# Patient Record
Sex: MALE | Race: OTHER | HISPANIC OR LATINO | URBAN - METROPOLITAN AREA
[De-identification: names, ages, dates, MRNs, and addresses within clinical notes are randomized per-mention and may not be internally consistent; named-entity substitution may affect disease eponyms.]

---

## 2017-01-06 ENCOUNTER — EMERGENCY (EMERGENCY)
Facility: HOSPITAL | Age: 52
LOS: 1 days | Discharge: HOME | End: 2017-01-06
Attending: EMERGENCY MEDICINE | Admitting: EMERGENCY MEDICINE
Payer: COMMERCIAL

## 2017-01-06 VITALS
TEMPERATURE: 98 F | OXYGEN SATURATION: 98 % | HEART RATE: 90 BPM | SYSTOLIC BLOOD PRESSURE: 176 MMHG | DIASTOLIC BLOOD PRESSURE: 105 MMHG | RESPIRATION RATE: 16 BRPM

## 2017-01-06 LAB
APPEARANCE UR: CLEAR — SIGNIFICANT CHANGE UP
BILIRUB UR-MCNC: NEGATIVE — SIGNIFICANT CHANGE UP
BLOOD UR QL VISUAL: NEGATIVE — SIGNIFICANT CHANGE UP
COLOR SPEC: YELLOW — SIGNIFICANT CHANGE UP
GLUCOSE UR-MCNC: NEGATIVE — SIGNIFICANT CHANGE UP
KETONES UR-MCNC: NEGATIVE — SIGNIFICANT CHANGE UP
LEUKOCYTE ESTERASE UR-ACNC: NEGATIVE — SIGNIFICANT CHANGE UP
MUCOUS THREADS # UR AUTO: SIGNIFICANT CHANGE UP
NITRITE UR-MCNC: NEGATIVE — SIGNIFICANT CHANGE UP
PH UR: 5.5 — SIGNIFICANT CHANGE UP (ref 4.6–8)
PROT UR-MCNC: 20 — SIGNIFICANT CHANGE UP
RBC CASTS # UR COMP ASSIST: SIGNIFICANT CHANGE UP (ref 0–?)
SP GR SPEC: 1.03 — SIGNIFICANT CHANGE UP (ref 1–1.03)
SQUAMOUS # UR AUTO: SIGNIFICANT CHANGE UP
UROBILINOGEN FLD QL: NORMAL E.U. — SIGNIFICANT CHANGE UP (ref 0.1–0.2)
WBC UR QL: SIGNIFICANT CHANGE UP (ref 0–?)

## 2017-01-06 PROCEDURE — 99283 EMERGENCY DEPT VISIT LOW MDM: CPT

## 2017-01-06 NOTE — ED PROVIDER NOTE - OBJECTIVE STATEMENT
50 y/o M pt with no significant PMHx presents to the ED for persistent, intermittent left groin pain with radiation to lower back and medial left thigh x few days. States sx feel similar to hernia; no hx of hernia. Denies constipation, dysuria, or any other complaints.

## 2017-01-06 NOTE — ED PROVIDER NOTE - PHYSICAL EXAMINATION
no cvat   rest nl  back non tender.   : reducible hernia palpated in left groin, inguinal hernia.     chaperone: [place]

## 2017-01-06 NOTE — ED PROVIDER NOTE - MEDICAL DECISION MAKING DETAILS
Consoled patient. Will check UA. If negative patient instructed to follow up with outpatient surgery for hernia.   advised to avoid constipation

## 2017-01-06 NOTE — ED PROVIDER NOTE - CARE PLAN
Principal Discharge DX:	Unilateral inguinal hernia without obstruction or gangrene, recurrence not specified

## 2017-01-06 NOTE — ED PROVIDER NOTE - NS ED MD SCRIBE ATTENDING SCRIBE SECTIONS
HIV/DISPOSITION/REVIEW OF SYSTEMS/VITAL SIGNS( Pullset)/PAST MEDICAL/SURGICAL/SOCIAL HISTORY/PHYSICAL EXAM/HISTORY OF PRESENT ILLNESS

## 2017-01-18 ENCOUNTER — OUTPATIENT (OUTPATIENT)
Dept: OUTPATIENT SERVICES | Facility: HOSPITAL | Age: 52
LOS: 1 days | End: 2017-01-18
Payer: COMMERCIAL

## 2017-01-18 ENCOUNTER — APPOINTMENT (OUTPATIENT)
Dept: ULTRASOUND IMAGING | Facility: IMAGING CENTER | Age: 52
End: 2017-01-18

## 2017-01-18 DIAGNOSIS — Z00.8 ENCOUNTER FOR OTHER GENERAL EXAMINATION: ICD-10-CM

## 2017-01-18 PROCEDURE — 76882 US LMTD JT/FCL EVL NVASC XTR: CPT

## 2017-06-14 ENCOUNTER — OUTPATIENT (OUTPATIENT)
Dept: OUTPATIENT SERVICES | Facility: HOSPITAL | Age: 52
LOS: 1 days | End: 2017-06-14
Payer: COMMERCIAL

## 2017-06-14 ENCOUNTER — APPOINTMENT (OUTPATIENT)
Dept: CT IMAGING | Facility: IMAGING CENTER | Age: 52
End: 2017-06-14

## 2017-06-14 DIAGNOSIS — Z00.8 ENCOUNTER FOR OTHER GENERAL EXAMINATION: ICD-10-CM

## 2017-06-14 PROCEDURE — 74177 CT ABD & PELVIS W/CONTRAST: CPT

## 2020-04-09 ENCOUNTER — TRANSCRIPTION ENCOUNTER (OUTPATIENT)
Age: 55
End: 2020-04-09

## 2020-08-06 ENCOUNTER — NON-APPOINTMENT (OUTPATIENT)
Age: 55
End: 2020-08-06

## 2020-08-06 ENCOUNTER — APPOINTMENT (OUTPATIENT)
Dept: INTERNAL MEDICINE | Facility: CLINIC | Age: 55
End: 2020-08-06
Payer: COMMERCIAL

## 2020-08-06 ENCOUNTER — LABORATORY RESULT (OUTPATIENT)
Age: 55
End: 2020-08-06

## 2020-08-06 VITALS
TEMPERATURE: 96.1 F | DIASTOLIC BLOOD PRESSURE: 87 MMHG | HEART RATE: 82 BPM | WEIGHT: 234 LBS | SYSTOLIC BLOOD PRESSURE: 125 MMHG | HEIGHT: 70 IN | BODY MASS INDEX: 33.5 KG/M2

## 2020-08-06 DIAGNOSIS — Z83.438 FAMILY HISTORY OF OTHER DISORDER OF LIPOPROTEIN METABOLISM AND OTHER LIPIDEMIA: ICD-10-CM

## 2020-08-06 DIAGNOSIS — Z78.9 OTHER SPECIFIED HEALTH STATUS: ICD-10-CM

## 2020-08-06 DIAGNOSIS — Z82.49 FAMILY HISTORY OF ISCHEMIC HEART DISEASE AND OTHER DISEASES OF THE CIRCULATORY SYSTEM: ICD-10-CM

## 2020-08-06 DIAGNOSIS — L29.9 PRURITUS, UNSPECIFIED: ICD-10-CM

## 2020-08-06 PROCEDURE — 93000 ELECTROCARDIOGRAM COMPLETE: CPT

## 2020-08-06 PROCEDURE — 36415 COLL VENOUS BLD VENIPUNCTURE: CPT

## 2020-08-06 PROCEDURE — 99386 PREV VISIT NEW AGE 40-64: CPT | Mod: 25

## 2020-08-06 NOTE — HEALTH RISK ASSESSMENT
[Patient reported colonoscopy was normal] : Patient reported colonoscopy was normal [With Family] : lives with family [Employed] : employed [] :  [# Of Children ___] : has [unfilled] children [Sexually Active] : sexually active [Fully functional (bathing, dressing, toileting, transferring, walking, feeding)] : Fully functional (bathing, dressing, toileting, transferring, walking, feeding) [Feels Safe at Home] : Feels safe at home [Fully functional (using the telephone, shopping, preparing meals, housekeeping, doing laundry, using] : Fully functional and needs no help or supervision to perform IADLs (using the telephone, shopping, preparing meals, housekeeping, doing laundry, using transportation, managing medications and managing finances) [Smoke Detector] : smoke detector [Seat Belt] :  uses seat belt [Carbon Monoxide Detector] : carbon monoxide detector [Sunscreen] : uses sunscreen [de-identified] : denies now, but will start  [de-identified] : less fruits/vegs.  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

## 2020-08-06 NOTE — ASSESSMENT
[FreeTextEntry1] : 55 year old male with h/o HTN presents for initial annual exam.  overall is doing well.  \par \par main issue is scalp itching at times.  states it would come on suddenly every few days.  noticed pimple like lesions over his scalp that causing itching, usually resolves on its own.  no lesion now.  no h/o eczema or psoriasis\par -dermatology referral \par \par HTN started on losartan several months ago.  controlled \par -cont meds as directed \par -cont checking BP at home \par \par Annual \par -Advised to get yearly Flu shot \par -Advised Yearly Eye exam with Ophthalmologist\par -Advised Yearly Dental exam\par -Educated of the importance of Healthy diet, such as Mediterranean Diet and Exercise, such as walking >20 minutes a day and increasing gradually as tolerated\par \par Preventative screening \par -advised to get colonoscopy for colon cancer screening -referral given \par -will check PSA for prostate cancer screening -labs drawn \par \par f/u in 6 months \par \par \par \par \par

## 2020-08-06 NOTE — HISTORY OF PRESENT ILLNESS
[de-identified] : 55 year old male with h/o HTN presents for initial annual exam.  overall is doing well.  \par main issue is scalp itching at times.  states it would come on suddenly every few days.  noticed pimple like lesions over his scalp that causing itching, usually resolves on its own.  no lesion now.  no h/o eczema or psoriasis\par \par h/o htn, started on losartan several months ago \par \par +overweight, does eat a lot of junk food, protein and carbs which occasionally causes constipation \par \par \par \par \par \par \par \par \par \par

## 2020-08-06 NOTE — PHYSICAL EXAM
[No Varicosities] : no varicosities [Normal] : no respiratory distress, lungs were clear to auscultation bilaterally and no accessory muscle use [Pedal Pulses Present] : the pedal pulses are present [No Edema] : there was no peripheral edema [Non Tender] : non-tender [Soft] : abdomen soft [No Joint Swelling] : no joint swelling [No CVA Tenderness] : no CVA  tenderness [Non-distended] : non-distended [No Spinal Tenderness] : no spinal tenderness [No Skin Lesions] : no skin lesions [Grossly Normal Strength/Tone] : grossly normal strength/tone [No Rash] : no rash [No Focal Deficits] : no focal deficits [Normal Gait] : normal gait [Coordination Grossly Intact] : coordination grossly intact

## 2020-08-07 LAB
25(OH)D3 SERPL-MCNC: 21.1 NG/ML
ALBUMIN SERPL ELPH-MCNC: 4.8 G/DL
ALP BLD-CCNC: 90 U/L
ALT SERPL-CCNC: 26 U/L
ANION GAP SERPL CALC-SCNC: 17 MMOL/L
APPEARANCE: CLEAR
AST SERPL-CCNC: 16 U/L
BACTERIA: NEGATIVE
BASOPHILS # BLD AUTO: 0.02 K/UL
BASOPHILS NFR BLD AUTO: 0.4 %
BILIRUB DIRECT SERPL-MCNC: 0.1 MG/DL
BILIRUB INDIRECT SERPL-MCNC: 0.3 MG/DL
BILIRUB SERPL-MCNC: 0.4 MG/DL
BILIRUBIN URINE: NEGATIVE
BLOOD URINE: NEGATIVE
BUN SERPL-MCNC: 16 MG/DL
CALCIUM SERPL-MCNC: 9.6 MG/DL
CHLORIDE SERPL-SCNC: 102 MMOL/L
CHOLEST SERPL-MCNC: 196 MG/DL
CHOLEST/HDLC SERPL: 5.7 RATIO
CO2 SERPL-SCNC: 20 MMOL/L
COLOR: YELLOW
CREAT SERPL-MCNC: 1.14 MG/DL
EOSINOPHIL # BLD AUTO: 0.04 K/UL
EOSINOPHIL NFR BLD AUTO: 0.7 %
ESTIMATED AVERAGE GLUCOSE: 111 MG/DL
FERRITIN SERPL-MCNC: 431 NG/ML
FOLATE SERPL-MCNC: 10.1 NG/ML
GLUCOSE QUALITATIVE U: NEGATIVE
GLUCOSE SERPL-MCNC: 95 MG/DL
HBA1C MFR BLD HPLC: 5.5 %
HCT VFR BLD CALC: 49.3 %
HDLC SERPL-MCNC: 34 MG/DL
HGB BLD-MCNC: 16.7 G/DL
HYALINE CASTS: 1 /LPF
IMM GRANULOCYTES NFR BLD AUTO: 0.4 %
IRON SATN MFR SERPL: 30 %
IRON SERPL-MCNC: 83 UG/DL
KETONES URINE: NEGATIVE
LDLC SERPL CALC-MCNC: 106 MG/DL
LEUKOCYTE ESTERASE URINE: NEGATIVE
LYMPHOCYTES # BLD AUTO: 2.21 K/UL
LYMPHOCYTES NFR BLD AUTO: 39.5 %
MAN DIFF?: NORMAL
MCHC RBC-ENTMCNC: 30.8 PG
MCHC RBC-ENTMCNC: 33.9 GM/DL
MCV RBC AUTO: 91 FL
MICROSCOPIC-UA: NORMAL
MONOCYTES # BLD AUTO: 0.36 K/UL
MONOCYTES NFR BLD AUTO: 6.4 %
NEUTROPHILS # BLD AUTO: 2.95 K/UL
NEUTROPHILS NFR BLD AUTO: 52.6 %
NITRITE URINE: NEGATIVE
PH URINE: 5.5
PLATELET # BLD AUTO: 237 K/UL
POTASSIUM SERPL-SCNC: 4.4 MMOL/L
PROT SERPL-MCNC: 7.1 G/DL
PROTEIN URINE: NORMAL
RBC # BLD: 5.42 M/UL
RBC # FLD: 12.5 %
RED BLOOD CELLS URINE: 1 /HPF
SAVE SPECIMEN: NORMAL
SODIUM SERPL-SCNC: 139 MMOL/L
SPECIFIC GRAVITY URINE: 1.03
SQUAMOUS EPITHELIAL CELLS: 0 /HPF
TIBC SERPL-MCNC: 278 UG/DL
TRIGL SERPL-MCNC: 281 MG/DL
TSH SERPL-ACNC: 3.03 UIU/ML
UIBC SERPL-MCNC: 195 UG/DL
UROBILINOGEN URINE: NORMAL
VIT B12 SERPL-MCNC: 500 PG/ML
WBC # FLD AUTO: 5.6 K/UL
WHITE BLOOD CELLS URINE: 0 /HPF

## 2021-05-14 ENCOUNTER — LABORATORY RESULT (OUTPATIENT)
Age: 56
End: 2021-05-14

## 2021-05-14 ENCOUNTER — APPOINTMENT (OUTPATIENT)
Dept: INTERNAL MEDICINE | Facility: CLINIC | Age: 56
End: 2021-05-14
Payer: COMMERCIAL

## 2021-05-14 VITALS
WEIGHT: 242 LBS | BODY MASS INDEX: 34.65 KG/M2 | HEART RATE: 89 BPM | HEIGHT: 70 IN | DIASTOLIC BLOOD PRESSURE: 92 MMHG | SYSTOLIC BLOOD PRESSURE: 143 MMHG | TEMPERATURE: 97.2 F | OXYGEN SATURATION: 99 %

## 2021-05-14 DIAGNOSIS — K57.92 DIVERTICULITIS OF INTESTINE, PART UNSPECIFIED, W/OUT PERFORATION OR ABSCESS W/OUT BLEEDING: ICD-10-CM

## 2021-05-14 DIAGNOSIS — R63.5 ABNORMAL WEIGHT GAIN: ICD-10-CM

## 2021-05-14 DIAGNOSIS — K42.9 UMBILICAL HERNIA W/OUT OBSTRUCTION OR GANGRENE: ICD-10-CM

## 2021-05-14 PROCEDURE — 99214 OFFICE O/P EST MOD 30 MIN: CPT | Mod: 25

## 2021-05-14 PROCEDURE — 99072 ADDL SUPL MATRL&STAF TM PHE: CPT

## 2021-05-14 NOTE — ASSESSMENT
[FreeTextEntry1] : 56 year old male history of obesity, hypertension presents complaining of acute left lower quadrant abdominal pain.  Started several days ago following communion of his grandson child.  Associated with sharp tenderness to palpation which has resolved somewhat but still present.  Denies any fever, chills, nausea, vomiting, diarrhea or melena.  last colonoscopy ~10 years ago, still hasn’t followed up.  \par Noted to have 10 pound weight gain since last summer, endorses eating more junk food and snacks.  Also more sedentary at work as picking up over time prior to anticipating intermediate.\par Still has not gotten Covid vaccine\par Symptoms can be related to acute mild diverticulitis\par -Advised we will treat with a course of Augmentin, risks and benefits of medication discussed in detail.\par -Advised to follow-up with gastroenterologist for repeat colonoscopy\par -We will check labs today\par -Advised healthy diet, such as Mediterranean Diet and Exercise, such as walking >20 minutes a day and increasing gradually as tolerated\par -advised to get covid vaccine \par \par HTN, not at goal, Takes losartan majority of time, but occasionally misses doses.  Has not taken it for the past 2 days\par -cont meds as directed \par -educated that increased blood pressure is linked to but limited to increase risk of heart disease, stroke, kidney failure and even death.  stressed the importance to lower values by complying to a less than 2g sodium diet, moderate cardiovascular exercise and decrease stress level.\par \par \par

## 2021-05-14 NOTE — HISTORY OF PRESENT ILLNESS
[FreeTextEntry8] : 56 year old male history of obesity, hypertension presents complaining of acute left lower quadrant abdominal pain.  Started several days ago following communion of his grandson child.  Associated with sharp tenderness to palpation which has resolved somewhat but still present.  Denies any fever, chills, nausea, vomiting, diarrhea or melena.  last colonoscopy ~10 years ago, still hasn’t followed up.  \par Noted to have 10 pound weight gain since last summer, endorses eating more junk food and snacks.  Also more sedentary at work as picking up over time prior to anticipating residential.\par Still has not gotten Covid vaccine\par \par Takes losartan majority of time, but occasionally misses doses.  Has not taken it for the past 2 days\par

## 2021-05-17 LAB
ALBUMIN SERPL ELPH-MCNC: 4.5 G/DL
ALP BLD-CCNC: 109 U/L
ALT SERPL-CCNC: 30 U/L
AMYLASE/CREAT SERPL: 42 U/L
ANION GAP SERPL CALC-SCNC: 14 MMOL/L
AST SERPL-CCNC: 16 U/L
BASOPHILS # BLD AUTO: 0.03 K/UL
BASOPHILS NFR BLD AUTO: 0.4 %
BILIRUB DIRECT SERPL-MCNC: 0.1 MG/DL
BILIRUB INDIRECT SERPL-MCNC: 0.3 MG/DL
BILIRUB SERPL-MCNC: 0.4 MG/DL
BUN SERPL-MCNC: 19 MG/DL
CALCIUM SERPL-MCNC: 9.4 MG/DL
CHLORIDE SERPL-SCNC: 101 MMOL/L
CHOLEST SERPL-MCNC: 194 MG/DL
CO2 SERPL-SCNC: 23 MMOL/L
CREAT SERPL-MCNC: 1.11 MG/DL
EOSINOPHIL # BLD AUTO: 0.08 K/UL
EOSINOPHIL NFR BLD AUTO: 1.1 %
ESTIMATED AVERAGE GLUCOSE: 120 MG/DL
FOLATE SERPL-MCNC: 7.4 NG/ML
GLUCOSE SERPL-MCNC: 113 MG/DL
HBA1C MFR BLD HPLC: 5.8 %
HCT VFR BLD CALC: 48.9 %
HDLC SERPL-MCNC: 33 MG/DL
HGB BLD-MCNC: 16.7 G/DL
IMM GRANULOCYTES NFR BLD AUTO: 0.3 %
LDLC SERPL CALC-MCNC: 121 MG/DL
LPL SERPL-CCNC: 22 U/L
LYMPHOCYTES # BLD AUTO: 2.23 K/UL
LYMPHOCYTES NFR BLD AUTO: 32 %
MAN DIFF?: NORMAL
MCHC RBC-ENTMCNC: 30.3 PG
MCHC RBC-ENTMCNC: 34.2 GM/DL
MCV RBC AUTO: 88.7 FL
MONOCYTES # BLD AUTO: 0.53 K/UL
MONOCYTES NFR BLD AUTO: 7.6 %
NEUTROPHILS # BLD AUTO: 4.08 K/UL
NEUTROPHILS NFR BLD AUTO: 58.6 %
NONHDLC SERPL-MCNC: 161 MG/DL
PLATELET # BLD AUTO: 275 K/UL
POTASSIUM SERPL-SCNC: 4.3 MMOL/L
PROT SERPL-MCNC: 7.1 G/DL
RBC # BLD: 5.51 M/UL
RBC # FLD: 12.5 %
SODIUM SERPL-SCNC: 138 MMOL/L
TRIGL SERPL-MCNC: 197 MG/DL
TSH SERPL-ACNC: 4.24 UIU/ML
VIT B12 SERPL-MCNC: 473 PG/ML
WBC # FLD AUTO: 6.97 K/UL

## 2021-05-25 ENCOUNTER — RX RENEWAL (OUTPATIENT)
Age: 56
End: 2021-05-25

## 2021-11-15 ENCOUNTER — APPOINTMENT (OUTPATIENT)
Dept: INTERNAL MEDICINE | Facility: CLINIC | Age: 56
End: 2021-11-15
Payer: COMMERCIAL

## 2021-11-15 ENCOUNTER — NON-APPOINTMENT (OUTPATIENT)
Age: 56
End: 2021-11-15

## 2021-11-15 VITALS
HEIGHT: 70 IN | OXYGEN SATURATION: 95 % | SYSTOLIC BLOOD PRESSURE: 137 MMHG | HEART RATE: 85 BPM | WEIGHT: 235 LBS | BODY MASS INDEX: 33.64 KG/M2 | DIASTOLIC BLOOD PRESSURE: 91 MMHG

## 2021-11-15 DIAGNOSIS — E55.9 VITAMIN D DEFICIENCY, UNSPECIFIED: ICD-10-CM

## 2021-11-15 DIAGNOSIS — M16.0 BILATERAL PRIMARY OSTEOARTHRITIS OF HIP: ICD-10-CM

## 2021-11-15 PROCEDURE — 93000 ELECTROCARDIOGRAM COMPLETE: CPT

## 2021-11-15 PROCEDURE — 99396 PREV VISIT EST AGE 40-64: CPT | Mod: 25

## 2021-11-15 RX ORDER — AMOXICILLIN AND CLAVULANATE POTASSIUM 875; 125 MG/1; MG/1
875-125 TABLET, COATED ORAL
Qty: 14 | Refills: 0 | Status: DISCONTINUED | COMMUNITY
Start: 2021-05-14 | End: 2021-11-15

## 2021-11-15 NOTE — HEALTH RISK ASSESSMENT
[Good] : ~his/her~  mood as  good [No] : No [No falls in past year] : Patient reported no falls in the past year [0] : 2) Feeling down, depressed, or hopeless: Not at all (0) [Employed] : employed [Fully functional (bathing, dressing, toileting, transferring, walking, feeding)] : Fully functional (bathing, dressing, toileting, transferring, walking, feeding) [Fully functional (using the telephone, shopping, preparing meals, housekeeping, doing laundry, using] : Fully functional and needs no help or supervision to perform IADLs (using the telephone, shopping, preparing meals, housekeeping, doing laundry, using transportation, managing medications and managing finances) [] : No [[id number]] : 0 [Patient reported colonoscopy was normal] : Patient reported colonoscopy was normal [Change in mental status noted] : No change in mental status noted [Reports changes in hearing] : Reports no changes in hearing [Reports changes in vision] : Reports no changes in vision [Reports changes in dental health] : Reports no changes in dental health [ColonoscopyDate] : 2011

## 2021-11-15 NOTE — HISTORY OF PRESENT ILLNESS
[de-identified] : 56 year old male with h/o HTN presents for annual exam.  \par overall is doing well.  \par \par h/o htn, started on losartan. occasionally misses doses.   Takes about 4 to 5 days a week\par \par +overweight, does eat a lot of junk food, protein and carbs which occasionally causes constipation \par \par h/o nicotine dependence, stopped smoking 1 pack/day about 5 years ago.  Occasionally relapses smoking for 2-3 days in a row then stops.  Denies any coughing, shortness of breath or chest pain.\par \par \par \par \par \par \par \par \par \par

## 2021-11-15 NOTE — ASSESSMENT
[FreeTextEntry1] : 56 year old male history of obesity, hypertension presents for annual \par \par \par HTN, stable \par -urged compliance with meds\par -educated that increased blood pressure is linked to but limited to increase risk of heart disease, stroke, kidney failure and even death.  stressed the importance to lower values by complying to a less than 2g sodium diet, moderate cardiovascular exercise and decrease stress level.\par \par Overweight\par -Being overweight increases your risk of health conditions such as heart disease, high blood pressure, type 2 diabetes, and certain types of cancer. It can also increase your risk for osteoarthritis, sleep apnea, and other respiratory problems. Aim for a slow, steady weight loss. Even a small amount of weight loss can lower your risk of health problems.\par \par h/o nicotine dependence smoker \par -start annual  lung CA screening \par \par Annual \par -Advised to get yearly Flu shot -refused \par -Advised Yearly Eye exam with Ophthalmologist\par -Advised Yearly Dental exam\par -Educated of the importance of Healthy diet, such as Mediterranean Diet and Exercise, such as walking >20 minutes a day and increasing gradually as tolerated\par \par Preventative screening \par -advised to get colonoscopy for colon cancer screening -referral given \par -will check PSA for prostate cancer screening -labs drawn \par \par -covid -up to date \par \par -discussed shingles vaccine (shingrix), advised to verify with insurance if its covered through medical or prescription plan\par \par \par \par \par \par \par \par \par \par \par

## 2021-11-15 NOTE — PHYSICAL EXAM
[Normal] : normal rate, regular rhythm, normal S1 and S2 and no murmur heard [No Varicosities] : no varicosities [Pedal Pulses Present] : the pedal pulses are present [No Edema] : there was no peripheral edema [No Extremity Clubbing/Cyanosis] : no extremity clubbing/cyanosis [Soft] : abdomen soft [Non-distended] : non-distended [Normal Bowel Sounds] : normal bowel sounds [No CVA Tenderness] : no CVA  tenderness [No Joint Swelling] : no joint swelling [Grossly Normal Strength/Tone] : grossly normal strength/tone [No Rash] : no rash [No Focal Deficits] : no focal deficits [Normal Affect] : the affect was normal [Normal Mood] : the mood was normal

## 2021-11-16 PROBLEM — E55.9 VITAMIN D DEFICIENCY, UNSPECIFIED: Status: ACTIVE | Noted: 2021-11-16

## 2021-11-16 LAB
25(OH)D3 SERPL-MCNC: 14.2 NG/ML
ALBUMIN SERPL ELPH-MCNC: 4.4 G/DL
ALP BLD-CCNC: 96 U/L
ALT SERPL-CCNC: 18 U/L
ANION GAP SERPL CALC-SCNC: 14 MMOL/L
APPEARANCE: CLEAR
AST SERPL-CCNC: 11 U/L
BACTERIA: NEGATIVE
BASOPHILS # BLD AUTO: 0.03 K/UL
BASOPHILS NFR BLD AUTO: 0.5 %
BILIRUB DIRECT SERPL-MCNC: 0.1 MG/DL
BILIRUB INDIRECT SERPL-MCNC: 0.4 MG/DL
BILIRUB SERPL-MCNC: 0.5 MG/DL
BILIRUBIN URINE: NEGATIVE
BLOOD URINE: NEGATIVE
BUN SERPL-MCNC: 20 MG/DL
CALCIUM SERPL-MCNC: 9.3 MG/DL
CHLORIDE SERPL-SCNC: 104 MMOL/L
CHOLEST SERPL-MCNC: 196 MG/DL
CO2 SERPL-SCNC: 23 MMOL/L
COLOR: YELLOW
CREAT SERPL-MCNC: 1.28 MG/DL
EOSINOPHIL # BLD AUTO: 0.1 K/UL
EOSINOPHIL NFR BLD AUTO: 1.6 %
ESTIMATED AVERAGE GLUCOSE: 114 MG/DL
FERRITIN SERPL-MCNC: 393 NG/ML
FOLATE SERPL-MCNC: 7.9 NG/ML
GLUCOSE QUALITATIVE U: NEGATIVE
GLUCOSE SERPL-MCNC: 107 MG/DL
HBA1C MFR BLD HPLC: 5.6 %
HCT VFR BLD CALC: 50 %
HDLC SERPL-MCNC: 30 MG/DL
HGB BLD-MCNC: 16.9 G/DL
HYALINE CASTS: 0 /LPF
IMM GRANULOCYTES NFR BLD AUTO: 0.3 %
IRON SATN MFR SERPL: 29 %
IRON SERPL-MCNC: 80 UG/DL
KETONES URINE: NEGATIVE
LDLC SERPL CALC-MCNC: 117 MG/DL
LEUKOCYTE ESTERASE URINE: NEGATIVE
LYMPHOCYTES # BLD AUTO: 2.68 K/UL
LYMPHOCYTES NFR BLD AUTO: 41.6 %
MAN DIFF?: NORMAL
MCHC RBC-ENTMCNC: 30.6 PG
MCHC RBC-ENTMCNC: 33.8 GM/DL
MCV RBC AUTO: 90.4 FL
MICROSCOPIC-UA: NORMAL
MONOCYTES # BLD AUTO: 0.54 K/UL
MONOCYTES NFR BLD AUTO: 8.4 %
NEUTROPHILS # BLD AUTO: 3.07 K/UL
NEUTROPHILS NFR BLD AUTO: 47.6 %
NITRITE URINE: NEGATIVE
NONHDLC SERPL-MCNC: 167 MG/DL
PH URINE: 6
PLATELET # BLD AUTO: 268 K/UL
POTASSIUM SERPL-SCNC: 4.4 MMOL/L
PROT SERPL-MCNC: 6.6 G/DL
PROTEIN URINE: ABNORMAL
PSA SERPL-MCNC: 0.51 NG/ML
RBC # BLD: 5.53 M/UL
RBC # FLD: 12.8 %
RED BLOOD CELLS URINE: 1 /HPF
SODIUM SERPL-SCNC: 141 MMOL/L
SPECIFIC GRAVITY URINE: >=1.03
SQUAMOUS EPITHELIAL CELLS: 1 /HPF
TIBC SERPL-MCNC: 280 UG/DL
TRIGL SERPL-MCNC: 251 MG/DL
TSH SERPL-ACNC: 3.16 UIU/ML
UIBC SERPL-MCNC: 200 UG/DL
UROBILINOGEN URINE: NORMAL
VIT B12 SERPL-MCNC: 513 PG/ML
WBC # FLD AUTO: 6.44 K/UL
WHITE BLOOD CELLS URINE: 1 /HPF

## 2021-12-08 ENCOUNTER — RX RENEWAL (OUTPATIENT)
Age: 56
End: 2021-12-08

## 2022-01-11 ENCOUNTER — APPOINTMENT (OUTPATIENT)
Dept: GASTROENTEROLOGY | Facility: CLINIC | Age: 57
End: 2022-01-11
Payer: COMMERCIAL

## 2022-01-11 VITALS
HEIGHT: 70 IN | SYSTOLIC BLOOD PRESSURE: 135 MMHG | BODY MASS INDEX: 33.79 KG/M2 | HEART RATE: 95 BPM | DIASTOLIC BLOOD PRESSURE: 90 MMHG | OXYGEN SATURATION: 97 % | TEMPERATURE: 98 F | WEIGHT: 236 LBS

## 2022-01-11 DIAGNOSIS — Z20.822 ENCOUNTER FOR PREPROCEDURAL LABORATORY EXAMINATION: ICD-10-CM

## 2022-01-11 DIAGNOSIS — K64.9 UNSPECIFIED HEMORRHOIDS: ICD-10-CM

## 2022-01-11 DIAGNOSIS — Z01.812 ENCOUNTER FOR PREPROCEDURAL LABORATORY EXAMINATION: ICD-10-CM

## 2022-01-11 PROCEDURE — 99204 OFFICE O/P NEW MOD 45 MIN: CPT

## 2022-01-11 NOTE — HISTORY OF PRESENT ILLNESS
[Heartburn] : denies heartburn [Nausea] : denies nausea [Vomiting] : denies vomiting [Diarrhea] : denies diarrhea [Constipation] : denies constipation [Yellow Skin Or Eyes (Jaundice)] : denies jaundice [Abdominal Pain] : denies abdominal pain [Abdominal Swelling] : denies abdominal swelling [Rectal Pain] : rectal pain [Wt Gain ___ Lbs] : no recent weight gain [Wt Loss ___ Lbs] : no recent weight loss [GERD] : no gastroesophageal reflux disease [Hiatus Hernia] : no hiatus hernia [Peptic Ulcer Disease] : no peptic ulcer disease [Pancreatitis] : no pancreatitis [Cholelithiasis] : no cholelithiasis [Kidney Stone] : no kidney stone [Inflammatory Bowel Disease] : no inflammatory bowel disease [Irritable Bowel Syndrome] : no irritable bowel syndrome [Diverticulitis] : no diverticulitis [Alcohol Abuse] : no alcohol abuse [Malignancy] : no malignancy [Abdominal Surgery] : no abdominal surgery [Appendectomy] : no appendectomy [Cholecystectomy] : no cholecystectomy [de-identified] : 57-year-old man presents for screening colonoscopy.  His last colonoscopy was over 10 years ago.  He complains of some rectal pain and pressure that he attributes to hemorrhoids.  He denies rectal bleeding, melena or hematemesis.  He was treated for acute diverticulitis several months ago and has no abdominal pain at the present time.

## 2022-01-11 NOTE — CONSULT LETTER
[Dear  ___] : Dear  [unfilled], [Consult Letter:] : I had the pleasure of evaluating your patient, [unfilled]. [Please see my note below.] : Please see my note below. [Consult Closing:] : Thank you very much for allowing me to participate in the care of this patient.  If you have any questions, please do not hesitate to contact me. [Sincerely,] : Sincerely, [FreeTextEntry3] : See GI Consult note.\par Thank you,\par Marques Mata MD, FACG\par

## 2022-01-11 NOTE — ASSESSMENT
[FreeTextEntry1] : Screening colonoscopy.  The importance of colon cancer screening and the colonoscopy prep was discussed with the patient.  He understands and all questions were answered.\par Internal hemorrhoids.  Topical treatment was prescribed.

## 2022-01-11 NOTE — PHYSICAL EXAM
[General Appearance - Alert] : alert [General Appearance - In No Acute Distress] : in no acute distress [Sclera] : the sclera and conjunctiva were normal [PERRL With Normal Accommodation] : pupils were equal in size, round, and reactive to light [Extraocular Movements] : extraocular movements were intact [Outer Ear] : the ears and nose were normal in appearance [Neck Appearance] : the appearance of the neck was normal [Neck Cervical Mass (___cm)] : no neck mass was observed [Jugular Venous Distention Increased] : there was no jugular-venous distention [Thyroid Diffuse Enlargement] : the thyroid was not enlarged [Thyroid Nodule] : there were no palpable thyroid nodules [Auscultation Breath Sounds / Voice Sounds] : lungs were clear to auscultation bilaterally [Heart Rate And Rhythm] : heart rate was normal and rhythm regular [Heart Sounds] : normal S1 and S2 [Heart Sounds Gallop] : no gallops [Murmurs] : no murmurs [Heart Sounds Pericardial Friction Rub] : no pericardial rub [Bowel Sounds] : normal bowel sounds [Abdomen Soft] : soft [Abdomen Tenderness] : non-tender [] : no hepato-splenomegaly [Abdomen Mass (___ Cm)] : no abdominal mass palpated [Normal Sphincter Tone] : normal sphincter tone [No Rectal Mass] : no rectal mass [Internal Hemorrhoid] : internal hemorrhoids [Occult Blood Positive] : stool was negative for occult blood [Cervical Lymph Nodes Enlarged Posterior Bilaterally] : posterior cervical [Cervical Lymph Nodes Enlarged Anterior Bilaterally] : anterior cervical [Supraclavicular Lymph Nodes Enlarged Bilaterally] : supraclavicular [No CVA Tenderness] : no ~M costovertebral angle tenderness [No Spinal Tenderness] : no spinal tenderness [Abnormal Walk] : normal gait [Nail Clubbing] : no clubbing  or cyanosis of the fingernails [Musculoskeletal - Swelling] : no joint swelling seen [Motor Tone] : muscle strength and tone were normal [Deep Tendon Reflexes (DTR)] : deep tendon reflexes were 2+ and symmetric [Sensation] : the sensory exam was normal to light touch and pinprick [No Focal Deficits] : no focal deficits [Oriented To Time, Place, And Person] : oriented to person, place, and time [Impaired Insight] : insight and judgment were intact [Affect] : the affect was normal

## 2022-03-16 ENCOUNTER — APPOINTMENT (OUTPATIENT)
Dept: GASTROENTEROLOGY | Facility: AMBULATORY MEDICAL SERVICES | Age: 57
End: 2022-03-16
Payer: COMMERCIAL

## 2022-03-16 PROCEDURE — 45385 COLONOSCOPY W/LESION REMOVAL: CPT | Mod: 33

## 2022-03-16 PROCEDURE — 45381 COLONOSCOPY SUBMUCOUS NJX: CPT | Mod: 33,59

## 2022-03-16 PROCEDURE — 45380 COLONOSCOPY AND BIOPSY: CPT | Mod: 33,59

## 2022-04-12 ENCOUNTER — APPOINTMENT (OUTPATIENT)
Dept: GASTROENTEROLOGY | Facility: CLINIC | Age: 57
End: 2022-04-12
Payer: COMMERCIAL

## 2022-04-12 VITALS
BODY MASS INDEX: 33.79 KG/M2 | OXYGEN SATURATION: 98 % | HEIGHT: 70 IN | WEIGHT: 236 LBS | SYSTOLIC BLOOD PRESSURE: 152 MMHG | HEART RATE: 107 BPM | TEMPERATURE: 97.2 F | DIASTOLIC BLOOD PRESSURE: 94 MMHG

## 2022-04-12 DIAGNOSIS — K63.5 POLYP OF COLON: ICD-10-CM

## 2022-04-12 PROCEDURE — 99213 OFFICE O/P EST LOW 20 MIN: CPT

## 2022-04-12 NOTE — ASSESSMENT
[FreeTextEntry1] : Screening colonoscopy in this high-risk patient with a history of colon polyps.  The importance of colon cancer screening and surveillance colonoscopy was discussed with the patient.  He will return in 1 year for a follow-up colonoscopy.

## 2022-04-12 NOTE — PHYSICAL EXAM
[General Appearance - Alert] : alert [General Appearance - In No Acute Distress] : in no acute distress [Neck Appearance] : the appearance of the neck was normal [Neck Cervical Mass (___cm)] : no neck mass was observed [Jugular Venous Distention Increased] : there was no jugular-venous distention [Thyroid Diffuse Enlargement] : the thyroid was not enlarged [Thyroid Nodule] : there were no palpable thyroid nodules [] : no respiratory distress [Auscultation Breath Sounds / Voice Sounds] : lungs were clear to auscultation bilaterally [Heart Rate And Rhythm] : heart rate was normal and rhythm regular [Heart Sounds] : normal S1 and S2 [Heart Sounds Gallop] : no gallops [Murmurs] : no murmurs [Heart Sounds Pericardial Friction Rub] : no pericardial rub [Cervical Lymph Nodes Enlarged Posterior Bilaterally] : posterior cervical [Cervical Lymph Nodes Enlarged Anterior Bilaterally] : anterior cervical [Supraclavicular Lymph Nodes Enlarged Bilaterally] : supraclavicular [Axillary Lymph Nodes Enlarged Bilaterally] : axillary [No CVA Tenderness] : no ~M costovertebral angle tenderness [No Spinal Tenderness] : no spinal tenderness

## 2022-04-12 NOTE — HISTORY OF PRESENT ILLNESS
[de-identified] : 57-year-old man comes for follow-up after undergoing a screening colonoscopy. He had 3 polyps removed from the transverse descending and distal sigmoid colon.  The polyps of tubular adenoma.  He tolerated the procedure well.

## 2022-09-08 ENCOUNTER — APPOINTMENT (OUTPATIENT)
Dept: INTERNAL MEDICINE | Facility: CLINIC | Age: 57
End: 2022-09-08

## 2022-09-08 ENCOUNTER — LABORATORY RESULT (OUTPATIENT)
Age: 57
End: 2022-09-08

## 2022-09-08 ENCOUNTER — NON-APPOINTMENT (OUTPATIENT)
Age: 57
End: 2022-09-08

## 2022-09-08 VITALS
DIASTOLIC BLOOD PRESSURE: 87 MMHG | WEIGHT: 238 LBS | HEART RATE: 84 BPM | SYSTOLIC BLOOD PRESSURE: 139 MMHG | BODY MASS INDEX: 34.07 KG/M2 | HEIGHT: 70 IN

## 2022-09-08 PROCEDURE — 93000 ELECTROCARDIOGRAM COMPLETE: CPT | Mod: 59

## 2022-09-08 PROCEDURE — 36415 COLL VENOUS BLD VENIPUNCTURE: CPT

## 2022-09-08 PROCEDURE — 99214 OFFICE O/P EST MOD 30 MIN: CPT | Mod: 25

## 2022-09-08 RX ORDER — HYDROCORTISONE ACETATE 25 MG/1
25 SUPPOSITORY RECTAL TWICE DAILY
Qty: 2 | Refills: 1 | Status: DISCONTINUED | COMMUNITY
Start: 2022-01-11 | End: 2022-09-08

## 2022-09-08 NOTE — ASSESSMENT
[FreeTextEntry1] : 56 year old male history of obesity, hypertension presents for follow-up.  \par \par GastroEsophageal Reflux Disease (GERD)\par -nature of disease discussed in length\par -omeprazole as directed \par -To prevent heartburn after meals:  Don't overeat. Eat five or six small meals each day, instead of several large meals. Don't eat before bedtime. Allow 2 hours to digest your food before lying down. This allows time for the food to pass out of the stomach and into the small intestine, rather than having it back up into the esophagus. Lying down makes digestion difficult and makes heartburn more likely.\par -Several foods are known to lead to GERD include: Alcohol, particularly red wine, Black pepper, garlic, raw onions, and other spicy foods, chocolate, citrus fruits and products, such as mirlande, oranges and orange juice,  coffee and caffeinated drinks, including tea and soda, peppermint, tomatoes \par -However, unless these foods are causing you heartburn you don't have to avoid them. \par -Several medications are known lead to GERD include-regular use of aspirin, NSAIDs include ibuprofen, naproxen, Celebrex, Sedatives, Narcotic painkillers, Progesterone, a hormone found in some birth control pills, iron and potassium.\par -advised to followup with GI for possible EGD\par \par HTN, stable \par -urged compliance with meds\par \par Overweight\par -Being overweight increases your risk of health conditions such as heart disease, high blood pressure, type 2 diabetes, and certain types of cancer. It can also increase your risk for osteoarthritis, sleep apnea, and other respiratory problems. Aim for a slow, steady weight loss. Even a small amount of weight loss can lower your risk of health problems.\par \par h/o nicotine dependence smoker \par -start annual  lung CA screening \par \par HCM\par -advised to get colonoscopy for colon cancer screening -referral given \par -will check PSA for prostate cancer screening -labs drawn \par -covid -up to date \par \par -discussed shingles vaccine (shingrix), advised to verify with insurance if its covered through medical or prescription plan\par \par \par \par \par \par \par \par \par \par \par \par \par

## 2022-09-08 NOTE — PHYSICAL EXAM
[Normal] : normal rate, regular rhythm, normal S1 and S2 and no murmur heard [No Varicosities] : no varicosities [Pedal Pulses Present] : the pedal pulses are present [No Edema] : there was no peripheral edema [No Extremity Clubbing/Cyanosis] : no extremity clubbing/cyanosis [Soft] : abdomen soft [Non-distended] : non-distended [Normal Bowel Sounds] : normal bowel sounds [No Rash] : no rash [No Focal Deficits] : no focal deficits

## 2022-09-08 NOTE — HISTORY OF PRESENT ILLNESS
[de-identified] : 57 year old male with h/o HTN presents for follow-up. \par \par states overall is doing ok.  besides increased Heartburn-like symptoms.  worse while lying down.  no radiation or alleviating symptoms.  Does endorse eating late and lying down at night.  States has tried taking once omeprazole which had helped.  No sobe or palpitations.  \par ecg- nsr, no changes \par \par h/o HTN, started on losartan. occasionally misses doses.   Takes about 4 to 5 days a week.  \par \par h/o nicotine dependence, stopped smoking 1 pack/day about 5 years ago.  Occasionally relapses smoking for 2-3 days in a row then stops.  Denies any coughing, shortness of breath or chest pain.

## 2022-09-13 LAB
ALBUMIN SERPL ELPH-MCNC: 4.5 G/DL
ALP BLD-CCNC: 105 U/L
ALT SERPL-CCNC: 25 U/L
ANION GAP SERPL CALC-SCNC: 13 MMOL/L
AST SERPL-CCNC: 13 U/L
BASOPHILS # BLD AUTO: 0.03 K/UL
BASOPHILS NFR BLD AUTO: 0.5 %
BILIRUB DIRECT SERPL-MCNC: 0.1 MG/DL
BILIRUB INDIRECT SERPL-MCNC: 0.3 MG/DL
BILIRUB SERPL-MCNC: 0.4 MG/DL
BUN SERPL-MCNC: 17 MG/DL
CALCIUM SERPL-MCNC: 9.2 MG/DL
CHLORIDE SERPL-SCNC: 103 MMOL/L
CHOLEST SERPL-MCNC: 237 MG/DL
CO2 SERPL-SCNC: 23 MMOL/L
CREAT SERPL-MCNC: 1.2 MG/DL
EGFR: 71 ML/MIN/1.73M2
EOSINOPHIL # BLD AUTO: 0.09 K/UL
EOSINOPHIL NFR BLD AUTO: 1.5 %
ESTIMATED AVERAGE GLUCOSE: 117 MG/DL
GLUCOSE SERPL-MCNC: 96 MG/DL
HBA1C MFR BLD HPLC: 5.7 %
HCT VFR BLD CALC: 46.5 %
HDLC SERPL-MCNC: 27 MG/DL
HGB BLD-MCNC: 16.8 G/DL
IMM GRANULOCYTES NFR BLD AUTO: 0.2 %
LDLC SERPL CALC-MCNC: NORMAL MG/DL
LYMPHOCYTES # BLD AUTO: 2.69 K/UL
LYMPHOCYTES NFR BLD AUTO: 44.6 %
MAN DIFF?: NORMAL
MCHC RBC-ENTMCNC: 31.6 PG
MCHC RBC-ENTMCNC: 36.1 GM/DL
MCV RBC AUTO: 87.6 FL
MONOCYTES # BLD AUTO: 0.49 K/UL
MONOCYTES NFR BLD AUTO: 8.1 %
NEUTROPHILS # BLD AUTO: 2.72 K/UL
NEUTROPHILS NFR BLD AUTO: 45.1 %
NONHDLC SERPL-MCNC: 210 MG/DL
PLATELET # BLD AUTO: 252 K/UL
POTASSIUM SERPL-SCNC: 4.5 MMOL/L
PROT SERPL-MCNC: 6.9 G/DL
RBC # BLD: 5.31 M/UL
RBC # FLD: 12.8 %
SODIUM SERPL-SCNC: 139 MMOL/L
TRIGL SERPL-MCNC: 600 MG/DL
TSH SERPL-ACNC: 4.85 UIU/ML
WBC # FLD AUTO: 6.03 K/UL

## 2022-10-17 ENCOUNTER — APPOINTMENT (OUTPATIENT)
Dept: INTERNAL MEDICINE | Facility: CLINIC | Age: 57
End: 2022-10-17

## 2022-10-17 ENCOUNTER — LABORATORY RESULT (OUTPATIENT)
Age: 57
End: 2022-10-17

## 2022-10-17 VITALS
WEIGHT: 236 LBS | HEART RATE: 84 BPM | DIASTOLIC BLOOD PRESSURE: 93 MMHG | BODY MASS INDEX: 33.79 KG/M2 | SYSTOLIC BLOOD PRESSURE: 146 MMHG | HEIGHT: 70 IN

## 2022-10-17 DIAGNOSIS — Z23 ENCOUNTER FOR IMMUNIZATION: ICD-10-CM

## 2022-10-17 PROCEDURE — 36415 COLL VENOUS BLD VENIPUNCTURE: CPT | Mod: 59

## 2022-10-17 PROCEDURE — 90686 IIV4 VACC NO PRSV 0.5 ML IM: CPT

## 2022-10-17 PROCEDURE — G0008: CPT

## 2022-10-17 PROCEDURE — 99214 OFFICE O/P EST MOD 30 MIN: CPT | Mod: 25

## 2022-10-17 NOTE — ASSESSMENT
[FreeTextEntry1] : 57 year old male history of obesity, hypertension presents for follow-up.  \par \par GastroEsophageal Reflux Disease (GERD)\par -nature of disease discussed in length\par -omeprazole as directed \par -advised to followup with GI for EGD\par \par HTN, stable \par -urged compliance with meds\par \par Hyperlipidemia with triglycerides 600.  States fasting 7 hours prior to blood draw today.\par -Advised if levels still elevated would recommend starting statin as discussed\par -Will check labs \par -Advised decrease greasy, fatty foods, increase exercise, fiber intake \par -Elevated cholesterol has been linked to increase in cardiovascular even such as heart attack, stroke, peripheral artery disease and death\par \par \par Overweight\par -Being overweight increases your risk of health conditions such as heart disease, high blood pressure, type 2 diabetes, and certain types of cancer. It can also increase your risk for osteoarthritis, sleep apnea, and other respiratory problems. Aim for a slow, steady weight loss. Even a small amount of weight loss can lower your risk of health problems.\par \par h/o nicotine dependence smoker \par -start annual  lung CA screening -still needs to complete \par \par HCM\par -advised to get colonoscopy for colon cancer screening -referral given \par -will check PSA for prostate cancer screening -labs drawn \par -covid -up to date \par \par Please note that 36 minutes time spent in care with this patient which includes pre-visit preparation such as reviewed pertinent labs, imaging, and specialists consultation, in-person visit, post-visit documentation and discussion.\par \par \par \par \par \par \par \par \par \par \par \par \par

## 2022-10-17 NOTE — PHYSICAL EXAM
[Normal] : normal rate, regular rhythm, normal S1 and S2 and no murmur heard [No Varicosities] : no varicosities [Pedal Pulses Present] : the pedal pulses are present [No Edema] : there was no peripheral edema [No Extremity Clubbing/Cyanosis] : no extremity clubbing/cyanosis [Soft] : abdomen soft [Non-distended] : non-distended [Normal Bowel Sounds] : normal bowel sounds

## 2022-10-17 NOTE — HISTORY OF PRESENT ILLNESS
[de-identified] : 57 year old male with h/o HTN presents for follow-up. \par \par never started statin, \par \par GERD better, but still present.  no dark or black stools.  former smoker \par \par states overall is doing ok.  besides increased Heartburn-like symptoms.  worse while lying down.  no radiation or alleviating symptoms.  Does endorse eating late and lying down at night.  States has tried taking once omeprazole which had helped.  No sobe or palpitations.  \par ecg- nsr, no changes \par \par h/o HTN, started on losartan. occasionally misses doses.   Takes about 4 to 5 days a week.  \par \par h/o nicotine dependence, stopped smoking 1 pack/day about 5 years ago.  Occasionally relapses smoking for 2-3 days in a row then stops.  Denies any coughing, shortness of breath or chest pain.

## 2022-10-20 LAB
ALBUMIN SERPL ELPH-MCNC: 4.7 G/DL
ALP BLD-CCNC: 108 U/L
ALT SERPL-CCNC: 22 U/L
ANION GAP SERPL CALC-SCNC: 13 MMOL/L
AST SERPL-CCNC: 16 U/L
BASOPHILS # BLD AUTO: 0.03 K/UL
BASOPHILS NFR BLD AUTO: 0.3 %
BILIRUB DIRECT SERPL-MCNC: 0.1 MG/DL
BILIRUB INDIRECT SERPL-MCNC: 0.2 MG/DL
BILIRUB SERPL-MCNC: 0.3 MG/DL
BUN SERPL-MCNC: 16 MG/DL
CALCIUM SERPL-MCNC: 9.7 MG/DL
CHLORIDE SERPL-SCNC: 102 MMOL/L
CHOLEST SERPL-MCNC: 215 MG/DL
CO2 SERPL-SCNC: 24 MMOL/L
CREAT SERPL-MCNC: 1.12 MG/DL
EGFR: 77 ML/MIN/1.73M2
EOSINOPHIL # BLD AUTO: 0.11 K/UL
EOSINOPHIL NFR BLD AUTO: 1.2 %
ESTIMATED AVERAGE GLUCOSE: 114 MG/DL
GLUCOSE SERPL-MCNC: 100 MG/DL
HBA1C MFR BLD HPLC: 5.6 %
HCT VFR BLD CALC: 49.8 %
HDLC SERPL-MCNC: 29 MG/DL
HGB BLD-MCNC: 17.5 G/DL
IMM GRANULOCYTES NFR BLD AUTO: 0.2 %
LDLC SERPL CALC-MCNC: 108 MG/DL
LYMPHOCYTES # BLD AUTO: 3.11 K/UL
LYMPHOCYTES NFR BLD AUTO: 34.7 %
MAN DIFF?: NORMAL
MCHC RBC-ENTMCNC: 31.9 PG
MCHC RBC-ENTMCNC: 35.1 GM/DL
MCV RBC AUTO: 90.7 FL
MONOCYTES # BLD AUTO: 0.84 K/UL
MONOCYTES NFR BLD AUTO: 9.4 %
NEUTROPHILS # BLD AUTO: 4.84 K/UL
NEUTROPHILS NFR BLD AUTO: 54.2 %
NONHDLC SERPL-MCNC: 185 MG/DL
PLATELET # BLD AUTO: 259 K/UL
POTASSIUM SERPL-SCNC: 4.4 MMOL/L
PROT SERPL-MCNC: 6.9 G/DL
RBC # BLD: 5.49 M/UL
RBC # FLD: 12.6 %
SODIUM SERPL-SCNC: 140 MMOL/L
THYROGLOB AB SERPL-ACNC: <20 IU/ML
THYROPEROXIDASE AB SERPL IA-ACNC: <10 IU/ML
TRIGL SERPL-MCNC: 387 MG/DL
TSH SERPL-ACNC: 7.5 UIU/ML
WBC # FLD AUTO: 8.95 K/UL

## 2022-11-08 ENCOUNTER — APPOINTMENT (OUTPATIENT)
Dept: GASTROENTEROLOGY | Facility: CLINIC | Age: 57
End: 2022-11-08

## 2022-11-08 VITALS
WEIGHT: 236 LBS | HEIGHT: 70 IN | BODY MASS INDEX: 33.79 KG/M2 | OXYGEN SATURATION: 97 % | DIASTOLIC BLOOD PRESSURE: 76 MMHG | TEMPERATURE: 97.6 F | SYSTOLIC BLOOD PRESSURE: 132 MMHG | HEART RATE: 93 BPM

## 2022-11-08 PROCEDURE — 99214 OFFICE O/P EST MOD 30 MIN: CPT

## 2022-11-08 RX ORDER — COVID-19 ANTIGEN TEST
KIT MISCELLANEOUS
Qty: 8 | Refills: 0 | Status: ACTIVE | COMMUNITY
Start: 2022-08-23

## 2022-11-08 RX ORDER — ATORVASTATIN CALCIUM 20 MG/1
20 TABLET, FILM COATED ORAL
Qty: 90 | Refills: 1 | Status: DISCONTINUED | COMMUNITY
Start: 2022-09-13 | End: 2022-11-08

## 2022-11-08 NOTE — ASSESSMENT
[FreeTextEntry1] : GERD. Dietary and lifestyle modification discussed with the patient.  EGD and biopsy to be scheduled.  Famotidine added to the patient's reflux regimen.  This was discussed with the patient.  He understands and all questions were answered.\par

## 2022-11-08 NOTE — HISTORY OF PRESENT ILLNESS
[FreeTextEntry1] : 57-year-old man with GERD complains of an exacerbation of his symptoms.  Complains of epigastric pain and burning especially at night.  He does not watch his diet and eats late at night.  However recently he has cut back on that and noted some improvement.  His PCP recently started him on omeprazole 40 mg a day also with some improvement.  He has never undergone an EGD.  Rectal bleeding, melena or hematemesis.

## 2022-11-08 NOTE — PHYSICAL EXAM
[None] : no edema [Cervical Lymph Nodes Enlarged Posterior Bilaterally] : no posterior cervical lymphadenopathy [Supraclavicular Lymph Nodes Enlarged Bilaterally] : no supraclavicular lymphadenopathy [Abnormal Walk] : normal gait [Normal] : oriented to person, place, and time

## 2022-11-17 ENCOUNTER — RX CHANGE (OUTPATIENT)
Age: 57
End: 2022-11-17

## 2022-12-07 ENCOUNTER — APPOINTMENT (OUTPATIENT)
Dept: GASTROENTEROLOGY | Facility: AMBULATORY MEDICAL SERVICES | Age: 57
End: 2022-12-07

## 2022-12-07 PROCEDURE — 43235 EGD DIAGNOSTIC BRUSH WASH: CPT

## 2023-01-18 ENCOUNTER — APPOINTMENT (OUTPATIENT)
Dept: GASTROENTEROLOGY | Facility: AMBULATORY MEDICAL SERVICES | Age: 58
End: 2023-01-18
Payer: COMMERCIAL

## 2023-01-18 PROCEDURE — 43239 EGD BIOPSY SINGLE/MULTIPLE: CPT

## 2023-02-14 ENCOUNTER — APPOINTMENT (OUTPATIENT)
Dept: GASTROENTEROLOGY | Facility: CLINIC | Age: 58
End: 2023-02-14
Payer: COMMERCIAL

## 2023-02-14 VITALS
SYSTOLIC BLOOD PRESSURE: 144 MMHG | DIASTOLIC BLOOD PRESSURE: 73 MMHG | OXYGEN SATURATION: 98 % | HEART RATE: 110 BPM | TEMPERATURE: 97.4 F | BODY MASS INDEX: 32.93 KG/M2 | WEIGHT: 230 LBS | HEIGHT: 70 IN

## 2023-02-14 DIAGNOSIS — K21.9 GASTRO-ESOPHAGEAL REFLUX DISEASE W/OUT ESOPHAGITIS: ICD-10-CM

## 2023-02-14 PROCEDURE — 99214 OFFICE O/P EST MOD 30 MIN: CPT

## 2023-02-14 RX ORDER — HYDROCORTISONE 25 MG/G
2.5 CREAM TOPICAL 3 TIMES DAILY
Qty: 1 | Refills: 2 | Status: DISCONTINUED | COMMUNITY
Start: 2022-01-21 | End: 2023-02-14

## 2023-02-14 NOTE — ASSESSMENT
[FreeTextEntry1] : GERD. Dietary and lifestyle modification discussed with the patient.  Continue to monitor her symptoms while patient tapers off the omeprazole.  He will remain on Pepcid as needed and resume medication should his symptoms recur.  This was discussed with the patient.  He understands and all questions were answered.\par \par  .

## 2023-02-14 NOTE — HISTORY OF PRESENT ILLNESS
[FreeTextEntry1] : 58-year-old man with GERD.  He is feeling much improved on the omeprazole and famotidine.  He has had no further episodes of acid reflux.  He is also watching his diet.  He underwent an EGD and biopsy on January 18 showed mild chronic gastritis but was otherwise unremarkable.  Gastric biopsies were negative.

## 2023-02-14 NOTE — PHYSICAL EXAM
[None] : no edema [Normal] : normal bowel sounds, non-tender, no masses, soft, no no hepato-splenomegaly [Cervical Lymph Nodes Enlarged Posterior Bilaterally] : no posterior cervical lymphadenopathy [Supraclavicular Lymph Nodes Enlarged Bilaterally] : no supraclavicular lymphadenopathy

## 2023-03-20 ENCOUNTER — NON-APPOINTMENT (OUTPATIENT)
Age: 58
End: 2023-03-20

## 2023-03-20 ENCOUNTER — APPOINTMENT (OUTPATIENT)
Dept: INTERNAL MEDICINE | Facility: CLINIC | Age: 58
End: 2023-03-20
Payer: COMMERCIAL

## 2023-03-20 VITALS
HEIGHT: 70.5 IN | HEART RATE: 94 BPM | WEIGHT: 227 LBS | DIASTOLIC BLOOD PRESSURE: 78 MMHG | SYSTOLIC BLOOD PRESSURE: 112 MMHG | BODY MASS INDEX: 32.14 KG/M2

## 2023-03-20 DIAGNOSIS — R79.89 OTHER SPECIFIED ABNORMAL FINDINGS OF BLOOD CHEMISTRY: ICD-10-CM

## 2023-03-20 DIAGNOSIS — R94.31 ABNORMAL ELECTROCARDIOGRAM [ECG] [EKG]: ICD-10-CM

## 2023-03-20 PROCEDURE — 93000 ELECTROCARDIOGRAM COMPLETE: CPT | Mod: 59

## 2023-03-20 PROCEDURE — 36415 COLL VENOUS BLD VENIPUNCTURE: CPT

## 2023-03-20 PROCEDURE — 99396 PREV VISIT EST AGE 40-64: CPT | Mod: 25

## 2023-03-20 NOTE — ASSESSMENT
[FreeTextEntry1] : 58 year old male history of obesity, hypertension presents for annual exam\par \par GastroEsophageal Reflux Disease (GERD)\par -nature of disease discussed in length\par -omeprazole as directed \par -advised to followup with GI for EGD\par \par HTN, stable \par -Continue losartan as directed\par \par Hyperlipidemia with hyper-triglyceridemia,\par -Advised if levels still elevated would recommend starting statin as discussed\par -Will check labs \par -Advised decrease greasy, fatty foods, increase exercise, fiber intake \par -Elevated cholesterol has been linked to increase in cardiovascular even such as heart attack, stroke, peripheral artery disease and death\par \par Overweight\par -Being overweight increases your risk of health conditions such as heart disease, high blood pressure, type 2 diabetes, and certain types of cancer. It can also increase your risk for osteoarthritis, sleep apnea, and other respiratory problems. Aim for a slow, steady weight loss. Even a small amount of weight loss can lower your risk of health problems.\par \par h/o nicotine dependence smoker \par -start annual  lung CA screening -still needs to complete \par \par Healthcare Maintenance\par -Advise Yearly Skin cancer screening with Dermatologist \par -Advise Yearly Eye exam with Ophthalmologist\par -Advise Yearly Dental exam\par -Educated of the importance of Healthy diet, such as Mediterranean Diet and Exercise, such as walking >20 minutes a day and increasing gradually as tolerated\par \par Immunizations\par -Flu vaccine \par -Covid vaccine -up to date \par \par Preventative screening \par -advised to get colonoscopy for colon cancer screening -up to date  \par -will check PSA for prostate cancer screening -labs drawn \par \par \par \par \par \par \par \par \par \par \par \par \par \par \par

## 2023-03-20 NOTE — HISTORY OF PRESENT ILLNESS
[de-identified] : 57 year old male with h/o Hypertension, hyperlipidemia, presents for follow-up. \par \par started on levothyroxine due to elevated tsh.  \par \par GERD better, but still present.  no dark or black stools.  former smoker \par \par states overall is doing ok.  besides increased Heartburn-like symptoms.  worse while lying down.  no radiation or alleviating symptoms.  Does endorse eating late and lying down at night.  States has tried taking once omeprazole which had helped.  No sobe or palpitations.  \par ecg- nsr, no changes \par \par h/o HTN, started on losartan. occasionally misses doses.   Takes about 4 to 5 days a week.  \par \par h/o nicotine dependence, stopped smoking 1 pack/day about 5 years ago.  Occasionally relapses smoking for 2-3 days in a row then stops.  Denies any coughing, shortness of breath or chest pain.

## 2023-03-20 NOTE — PHYSICAL EXAM
[Normal] : normal rate, regular rhythm, normal S1 and S2 and no murmur heard [No Varicosities] : no varicosities [Pedal Pulses Present] : the pedal pulses are present [No Edema] : there was no peripheral edema [No Extremity Clubbing/Cyanosis] : no extremity clubbing/cyanosis [Non-distended] : non-distended [Soft] : abdomen soft [Normal Bowel Sounds] : normal bowel sounds [de-identified] : not performed as wearing mask due to covid precautions

## 2023-03-20 NOTE — HEALTH RISK ASSESSMENT
[Good] : ~his/her~  mood as  good [No falls in past year] : Patient reported no falls in the past year [0] : 2) Feeling down, depressed, or hopeless: Not at all (0) [PHQ-2 Negative - No further assessment needed] : PHQ-2 Negative - No further assessment needed [Patient reported colonoscopy was normal] : Patient reported colonoscopy was normal [Fully functional (bathing, dressing, toileting, transferring, walking, feeding)] : Fully functional (bathing, dressing, toileting, transferring, walking, feeding) [Fully functional (using the telephone, shopping, preparing meals, housekeeping, doing laundry, using] : Fully functional and needs no help or supervision to perform IADLs (using the telephone, shopping, preparing meals, housekeeping, doing laundry, using transportation, managing medications and managing finances) [Current] : Current [[id number]] : 0 [Change in mental status noted] : No change in mental status noted [Reports changes in hearing] : Reports no changes in hearing [Reports changes in vision] : Reports no changes in vision [Reports changes in dental health] : Reports no changes in dental health [ColonoscopyDate] : 2/2023

## 2023-03-29 DIAGNOSIS — E78.5 HYPERLIPIDEMIA, UNSPECIFIED: ICD-10-CM

## 2023-03-29 DIAGNOSIS — I10 ESSENTIAL (PRIMARY) HYPERTENSION: ICD-10-CM

## 2023-03-29 DIAGNOSIS — E03.9 HYPOTHYROIDISM, UNSPECIFIED: ICD-10-CM

## 2023-03-29 LAB
ALBUMIN SERPL ELPH-MCNC: 4.5 G/DL
ALP BLD-CCNC: 107 U/L
ALT SERPL-CCNC: 29 U/L
ANION GAP SERPL CALC-SCNC: 12 MMOL/L
APPEARANCE: CLEAR
AST SERPL-CCNC: 16 U/L
BACTERIA: NEGATIVE
BASOPHILS # BLD AUTO: 0.03 K/UL
BASOPHILS NFR BLD AUTO: 0.3 %
BILIRUB DIRECT SERPL-MCNC: 0.2 MG/DL
BILIRUB INDIRECT SERPL-MCNC: 0.5 MG/DL
BILIRUB SERPL-MCNC: 0.7 MG/DL
BILIRUBIN URINE: NEGATIVE
BLOOD URINE: NEGATIVE
BUN SERPL-MCNC: 18 MG/DL
CALCIUM SERPL-MCNC: 9.5 MG/DL
CHLORIDE SERPL-SCNC: 104 MMOL/L
CHOLEST SERPL-MCNC: 209 MG/DL
CO2 SERPL-SCNC: 24 MMOL/L
COLOR: YELLOW
CREAT SERPL-MCNC: 1.15 MG/DL
EGFR: 74 ML/MIN/1.73M2
EOSINOPHIL # BLD AUTO: 0.06 K/UL
EOSINOPHIL NFR BLD AUTO: 0.6 %
ESTIMATED AVERAGE GLUCOSE: 114 MG/DL
FERRITIN SERPL-MCNC: 452 NG/ML
FOLATE SERPL-MCNC: 7.7 NG/ML
GLUCOSE QUALITATIVE U: NEGATIVE
GLUCOSE SERPL-MCNC: 99 MG/DL
HBA1C MFR BLD HPLC: 5.6 %
HCT VFR BLD CALC: 48.9 %
HDLC SERPL-MCNC: 34 MG/DL
HGB BLD-MCNC: 16.7 G/DL
HYALINE CASTS: 1 /LPF
IMM GRANULOCYTES NFR BLD AUTO: 0.3 %
IRON SATN MFR SERPL: 48 %
IRON SERPL-MCNC: 124 UG/DL
KETONES URINE: NEGATIVE
LDLC SERPL CALC-MCNC: 139 MG/DL
LEUKOCYTE ESTERASE URINE: NEGATIVE
LYMPHOCYTES # BLD AUTO: 2.99 K/UL
LYMPHOCYTES NFR BLD AUTO: 32.3 %
MAN DIFF?: NORMAL
MCHC RBC-ENTMCNC: 31.2 PG
MCHC RBC-ENTMCNC: 34.2 GM/DL
MCV RBC AUTO: 91.2 FL
MICROSCOPIC-UA: NORMAL
MONOCYTES # BLD AUTO: 0.6 K/UL
MONOCYTES NFR BLD AUTO: 6.5 %
NEUTROPHILS # BLD AUTO: 5.56 K/UL
NEUTROPHILS NFR BLD AUTO: 60 %
NITRITE URINE: NEGATIVE
NONHDLC SERPL-MCNC: 175 MG/DL
PH URINE: 5.5
PLATELET # BLD AUTO: 241 K/UL
POTASSIUM SERPL-SCNC: 4.3 MMOL/L
PROT SERPL-MCNC: 6.6 G/DL
PROTEIN URINE: NORMAL
PSA SERPL-MCNC: 0.57 NG/ML
RBC # BLD: 5.36 M/UL
RBC # FLD: 12.7 %
RED BLOOD CELLS URINE: 2 /HPF
SODIUM SERPL-SCNC: 140 MMOL/L
SPECIFIC GRAVITY URINE: 1.03
SQUAMOUS EPITHELIAL CELLS: 0 /HPF
T3FREE SERPL-MCNC: 3.28 PG/ML
T4 FREE SERPL-MCNC: 1.4 NG/DL
TIBC SERPL-MCNC: 259 UG/DL
TRIGL SERPL-MCNC: 181 MG/DL
TSH SERPL-ACNC: 5.01 UIU/ML
UIBC SERPL-MCNC: 134 UG/DL
UROBILINOGEN URINE: NORMAL
VIT B12 SERPL-MCNC: 462 PG/ML
WBC # FLD AUTO: 9.27 K/UL
WHITE BLOOD CELLS URINE: 1 /HPF

## 2023-04-20 ENCOUNTER — NON-APPOINTMENT (OUTPATIENT)
Age: 58
End: 2023-04-20

## 2023-04-20 VITALS — HEIGHT: 70 IN | WEIGHT: 225 LBS | BODY MASS INDEX: 32.21 KG/M2

## 2023-04-20 NOTE — REASON FOR VISIT
[Home] : at home, [unfilled] , at the time of the visit. [Medical Office: ([facility])___] : at the medical office located in  [Verbal consent obtained from patient] : the patient, [unfilled] [Initial Evaluation] : an initial evaluation visit [Review of Eligibility] : review of eligibility [Low-Dose CT Screening Discussion] : low-dose CT lung cancer screening discussion [Virtual Visit] : virtual visit

## 2023-04-20 NOTE — HISTORY OF PRESENT ILLNESS
[Former] : Former [TextBox_13] : Referred by Dr. Swanson\par \par Mr. CAT LANE is a 58 year old man with a history of nicotine dependence\par \par  Over the telephone today we reviewed and confirmed that the patient meets screening eligibility criteria:\par \par -Age: 58 years old\par \par Smoking status:\par \par -Former smoker, [id number] years, quit 2017\par \par \par \par Mr. LANE denies any personal history of lung cancer. Denies any s/s of lung cancer. No lung cancer in a 1st degree relative. Denies any history of lung disease. Denies any history of occupational exposures\par  [YearQuit] : 2017 [PacksperDay] : 1 [N_Years] : 27 [PacksperYear] : 27

## 2023-04-21 ENCOUNTER — OUTPATIENT (OUTPATIENT)
Dept: OUTPATIENT SERVICES | Facility: HOSPITAL | Age: 58
LOS: 1 days | End: 2023-04-21
Payer: COMMERCIAL

## 2023-04-21 ENCOUNTER — APPOINTMENT (OUTPATIENT)
Dept: CT IMAGING | Facility: IMAGING CENTER | Age: 58
End: 2023-04-21
Payer: COMMERCIAL

## 2023-04-21 DIAGNOSIS — Z87.891 PERSONAL HISTORY OF NICOTINE DEPENDENCE: ICD-10-CM

## 2023-04-21 PROCEDURE — 71271 CT THORAX LUNG CANCER SCR C-: CPT

## 2023-04-21 PROCEDURE — 71271 CT THORAX LUNG CANCER SCR C-: CPT | Mod: 26

## 2023-05-03 ENCOUNTER — CLINICAL ADVICE (OUTPATIENT)
Age: 58
End: 2023-05-03

## 2023-05-03 ENCOUNTER — NON-APPOINTMENT (OUTPATIENT)
Age: 58
End: 2023-05-03

## 2023-05-16 NOTE — ED PROVIDER NOTE - CPE EDP SKIN NORM
Patient has been engaged in both DBT skills and leisure groups but often requires prompting to engage in skills groups. Patient is often isolative and engages in activities by himself.     During his stay on the unit, patient's goal was to identify and utilize 2 coping skills.       SI/HI/NSSI/AH/VH.  Patient has been engaged in both DBT skills and leisure groups but often requires prompting to engage in skills groups. Patient is often isolative and engages in activities by himself. Patient stated he feels he benefited from the community found on the unit.     During his stay on the unit, patient's goal was to identify and utilize 2 coping skills. Patient had difficulty identifying goals and required prompting and collaboration. Patient stated distress tolerance and interpersonal effectiveness, particularly taking a pause before engaging in a response.     Patient denied SI/HI/NSSI/AH/VH.  normal...

## 2023-05-17 RX ORDER — SODIUM SULFATE, POTASSIUM SULFATE AND MAGNESIUM SULFATE 1.6; 3.13; 17.5 G/177ML; G/177ML; G/177ML
17.5-3.13-1.6 SOLUTION ORAL
Qty: 1 | Refills: 0 | Status: ACTIVE | COMMUNITY
Start: 2022-01-11 | End: 1900-01-01

## 2023-05-18 DIAGNOSIS — Z12.12 ENCOUNTER FOR SCREENING FOR MALIGNANT NEOPLASM OF COLON: ICD-10-CM

## 2023-05-18 DIAGNOSIS — Z12.11 ENCOUNTER FOR SCREENING FOR MALIGNANT NEOPLASM OF COLON: ICD-10-CM

## 2023-05-24 ENCOUNTER — APPOINTMENT (OUTPATIENT)
Dept: GASTROENTEROLOGY | Facility: AMBULATORY MEDICAL SERVICES | Age: 58
End: 2023-05-24

## 2023-06-23 ENCOUNTER — OUTPATIENT (OUTPATIENT)
Dept: OUTPATIENT SERVICES | Facility: HOSPITAL | Age: 58
LOS: 1 days | End: 2023-06-23
Payer: COMMERCIAL

## 2023-06-23 ENCOUNTER — APPOINTMENT (OUTPATIENT)
Dept: CT IMAGING | Facility: CLINIC | Age: 58
End: 2023-06-23
Payer: COMMERCIAL

## 2023-06-23 DIAGNOSIS — R06.00 DYSPNEA, UNSPECIFIED: ICD-10-CM

## 2023-06-23 PROCEDURE — 75574 CT ANGIO HRT W/3D IMAGE: CPT | Mod: 26

## 2023-06-26 PROCEDURE — 0502T: CPT

## 2023-06-26 PROCEDURE — 75574 CT ANGIO HRT W/3D IMAGE: CPT

## 2023-06-26 PROCEDURE — 0504T: CPT

## 2023-06-26 PROCEDURE — 0503T: CPT

## 2023-07-19 RX ORDER — CHLORHEXIDINE GLUCONATE 213 G/1000ML
1 SOLUTION TOPICAL ONCE
Refills: 0 | Status: DISCONTINUED | OUTPATIENT
Start: 2023-07-20 | End: 2023-07-21

## 2023-07-19 NOTE — H&P PST ADULT - HISTORY OF PRESENT ILLNESS
Department of Cardiology                                                   [facility]/[place] [address] [address]                                            Telephone: [phone]. Fax:[phone]                                              Pre- Cardiac Procedure H + P/Progress Note      HPI:      59 yo female wtih PMHX of HTN, HLD, current smoker who was found with LV systolic dysfunction, LV apical thrombus and CAD on CTA, showing occluded LAD.  His recent ischemic evaluation showed ischemia in the LAD territory. Given the extent of ischemia and LV dysfunction he now presents for left heart catheterization to rule out underlying CAD.     Symptoms:        Angina (Class):        Ischemic Symptoms:     Heart Failure:        Systolic/Diastolic/Combined:        NYHA Class (within 2 weeks):     Assessment of LVEF:       EF:        Assessed by:        Date:     Prior Cardiac Interventions:         Noninvasive Testing:   Stress Test: Date:        Protocol:        Duration of Exercise:        Symptoms:        EKG Changes:        DTS:        Myocardial Imaging:        Risk Assessment (Low, Medium, High):     Echo:       Risk Assessments:  ASA:  Mallampati:  Bleeding Risk:  Creatinine:  GFR:    Associated Risk Factors:        Frailty Screening: (N/A, mild, mod, severe)       Cerebrovascular Disease: N/A       Chronic Lung Disease: N/A       Peripheral Arterial Disease: N/A       Chronic Kidney Disease (if yes, what is GFR): N/A       Uncontrolled Diabetes (if yes, what is HgbA1C or FBS): N/A       Poorly Controlled Hypertension (if yes, what is SBP): N/A       Morbid Obesity (if yes, what is BMI): N/A       History of Recent Ventricular Arrhythmia: N/A       Inability to Ambulate Safely: N/A       Need for Therapeutic Anticoagulation: N/A       Antiplatelet or Contrast Allergy: N/A    Antianginal Therapies:        Beta Blockers:         Calcium Channel Blockers:        Long Acting Nitrates:        Ranexa:     	  MEDICATIONS:                    ROS:     PHYSICAL EXAM:      T(C): --  HR: --  BP: --  RR: --  SpO2: --  Wt(kg): --      I&O's Summary      Daily     Daily     TELEMETRY: 	      ECG:  	    LABS:	 	                        Tnl:    Lipid Profile:   TC  TG  LDL  HDL    HgA1c:     proBNP:     TSH:     Impression/Plan:      ****-IVH with NS 250mL bolus pre-cath                                                            Department of Cardiology                                                   [facility]/[place] [address] [address]                                            Telephone: [phone]. Fax:[phone]                                              Pre- Cardiac Procedure H + P/Progress Note      HPI:      59 yo female with  PMHX of HTN, HLD, current smoker who was found with LV systolic dysfunction, LV apical thrombus and CAD on CTA, showing occluded LAD.  His recent ischemic evaluation showed ischemia in the LAD territory. Recent echocardiogram shows LV systolic function is mildly decreased with moderate hypokinesis of apical septal, apical anterior segments and akinesis of apical segments. There is a mobile uniformly echogenic mass measuring 1.8x1.4 cm attached to LV apex; LVEF 45%. Given the extent of ischemia and LV dysfunction he now presents for left heart catheterization to rule out underlying CAD.     Symptoms:        Angina (Class):        Ischemic Symptoms:     Heart Failure:        Systolic/Diastolic/Combined:        NYHA Class (within 2 weeks):     Assessment of LVEF:       EF:   45%        Assessed by:  Echocardiogram        Date:   6/2023     Prior Cardiac Interventions:  None          Noninvasive Testing:   Stress Test: Date:        Protocol:        Duration of Exercise:        Symptoms:        EKG Changes:        DTS:        Myocardial Imaging:        Risk Assessment (Low, Medium, High):     Echo:        Associated Risk Factors:        Frailty Screening: (N/A, mild, mod, severe)       Cerebrovascular Disease: N/A       Chronic Lung Disease: N/A       Peripheral Arterial Disease: N/A       Chronic Kidney Disease (if yes, what is GFR): N/A       Uncontrolled Diabetes (if yes, what is HgbA1C or FBS): N/A       Poorly Controlled Hypertension (if yes, what is SBP): N/A       Morbid Obesity (if yes, what is BMI): N/A       History of Recent Ventricular Arrhythmia: N/A       Inability to Ambulate Safely: N/A       Need for Therapeutic Anticoagulation: N/A       Antiplatelet or Contrast Allergy: N/A    Antianginal Therapies:        Beta Blockers:         Calcium Channel Blockers:        Long Acting Nitrates:        Ranexa:     	  MEDICATIONS:        ROS:     PHYSICAL EXAM:      T(C): --  HR: --  BP: --  RR: --  SpO2: --  Wt(kg): --      I&O's Summary      Daily     Daily     TELEMETRY: 	      ECG:  	    LABS:	 	      HDL    HgA1c:     proBNP:     TSH:     Impression/Plan:      ****-IVH with NS 250mL bolus pre-cath                                                            Department of Cardiology                                                   [facility]/[place] [address]                                            Telephone: [phone]. Fax:[phone]                                              Pre- Cardiac Procedure H + P/Progress Note      HPI:      59 yo male with  PMHX of HTN, HLD, current smoker who was found with LV systolic dysfunction, LV apical thrombus and CAD on CTA, showing occluded LAD.  His recent ischemic evaluation showed ischemia in the LAD territory. Recent echocardiogram shows LV systolic function is mildly decreased with moderate hypokinesis of apical septal, apical anterior segments and akinesis of apical segments. There is a mobile uniformly echogenic mass measuring 1.8x1.4 cm attached to LV apex; LVEF 45%. Given the extent of ischemia and LV dysfunction he now presents for left heart catheterization to rule out underlying CAD.     Symptoms:        Angina (Class):        Ischemic Symptoms:     Heart Failure:        Systolic/Diastolic/Combined:        NYHA Class (within 2 weeks):     Assessment of LVEF:       EF:   45%        Assessed by:  Echocardiogram        Date:   6/2023     Prior Cardiac Interventions:  None          Noninvasive Testing:   Stress Test: Date:        Protocol:        Duration of Exercise:        Symptoms:        EKG Changes:        DTS:        Myocardial Imaging:        Risk Assessment (Low, Medium, High):     Echo:  EF 45% There is moderate hypokinesis of the apical septal, apical anterior segments and akinesis of apical segments. There is a mobile uniformly echogenic mass measuring 1.8x 1.4cm attached to LV apex.       Associated Risk Factors:        Frailty Screening: (N/A, mild, mod, severe)       Cerebrovascular Disease: N/A       Chronic Lung Disease: N/A       Peripheral Arterial Disease: N/A       Chronic Kidney Disease (if yes, what is GFR): N/A       Uncontrolled Diabetes (if yes, what is HgbA1C or FBS): N/A       Poorly Controlled Hypertension (if yes, what is SBP): N/A       Morbid Obesity (if yes, what is BMI): N/A       History of Recent Ventricular Arrhythmia: N/A       Inability to Ambulate Safely: N/A       Need for Therapeutic Anticoagulation: N/A       Antiplatelet or Contrast Allergy: N/A    Antianginal Therapies:        Beta Blockers:         Calcium Channel Blockers:        Long Acting Nitrates:        Ranexa:

## 2023-07-19 NOTE — H&P PST ADULT - ASSESSMENT
59 yo female wtih PMHX of HTN, HLD, current smoker who was found with LV systolic dysfunction, LV apical thrombus and CAD on CTA, showing occluded LAD.  His recent ischemic evaluation showed ischemia in the LAD territory. Given the extent of ischemia and LV dysfunction he now presents for left heart catheterization to rule out underlying CAD.       ASA  Mallampati  GFR  Creat  Bleeding Risk  COVID-19        -plan for LHC via RRA versus RFA  -patient seen and examined  -confirmed appropriate NPO duration  -ECG and Labs reviewed  -Aspirin 81mg po pre-cath  -procedure discussed with patient; risks and benefits explained, questions answered  -consent obtained by attending IC  -pre and post  cc NS bolus ordered to prevent CATHY  -pt instructed IF STENT PLACED WILL REQUIRE TO STAY OVERNIGHT FOR MONITORING AND DISCHARGED IN THE AM    -cardiac rehab info provided if stent is placed recommended to start if needed post PCI      57 yo female wtih PMHX of HTN, HLD, current smoker who was found with LV systolic dysfunction, LV apical thrombus and CAD on CTA, showing occluded LAD.  His recent ischemic evaluation showed ischemia in the LAD territory. Given the extent of ischemia and LV dysfunction he now presents for left heart catheterization to rule out underlying CAD.       ASA 2  Mallampati 2  GFR 72  Creat 1.18  Bleeding Risk 0.8%      -plan for LHC via RRA versus RFA  -patient seen and examined  -confirmed appropriate NPO duration  -ECG and Labs reviewed  -Aspirin 81mg po pre-cath  -procedure discussed with patient; risks and benefits explained, questions answered  -consent obtained by attending IC  -EF 45% contraindicated for pre procedure CATHY prophylaxis  -pt instructed IF STENT PLACED WILL REQUIRE TO STAY OVERNIGHT FOR MONITORING AND DISCHARGED IN THE AM    -cardiac rehab info provided if stent is placed recommended to start if needed post PCI

## 2023-07-20 ENCOUNTER — INPATIENT (INPATIENT)
Facility: HOSPITAL | Age: 58
LOS: 0 days | Discharge: HOME | DRG: 247 | End: 2023-07-21
Attending: INTERNAL MEDICINE | Admitting: INTERNAL MEDICINE
Payer: COMMERCIAL

## 2023-07-20 ENCOUNTER — TRANSCRIPTION ENCOUNTER (OUTPATIENT)
Age: 58
End: 2023-07-20

## 2023-07-20 VITALS
OXYGEN SATURATION: 100 % | DIASTOLIC BLOOD PRESSURE: 82 MMHG | RESPIRATION RATE: 16 BRPM | SYSTOLIC BLOOD PRESSURE: 133 MMHG | HEART RATE: 86 BPM | TEMPERATURE: 98 F

## 2023-07-20 DIAGNOSIS — R93.1 ABNORMAL FINDINGS ON DIAGNOSTIC IMAGING OF HEART AND CORONARY CIRCULATION: ICD-10-CM

## 2023-07-20 LAB
ANION GAP SERPL CALC-SCNC: 13 MMOL/L — SIGNIFICANT CHANGE UP (ref 5–17)
BASOPHILS # BLD AUTO: 0.05 K/UL — SIGNIFICANT CHANGE UP (ref 0–0.2)
BASOPHILS NFR BLD AUTO: 0.6 % — SIGNIFICANT CHANGE UP (ref 0–2)
BUN SERPL-MCNC: 20.5 MG/DL — HIGH (ref 8–20)
CALCIUM SERPL-MCNC: 9.4 MG/DL — SIGNIFICANT CHANGE UP (ref 8.4–10.5)
CHLORIDE SERPL-SCNC: 98 MMOL/L — SIGNIFICANT CHANGE UP (ref 96–108)
CO2 SERPL-SCNC: 28 MMOL/L — SIGNIFICANT CHANGE UP (ref 22–29)
CREAT SERPL-MCNC: 1.18 MG/DL — SIGNIFICANT CHANGE UP (ref 0.5–1.3)
EGFR: 72 ML/MIN/1.73M2 — SIGNIFICANT CHANGE UP
EOSINOPHIL # BLD AUTO: 0.09 K/UL — SIGNIFICANT CHANGE UP (ref 0–0.5)
EOSINOPHIL NFR BLD AUTO: 1 % — SIGNIFICANT CHANGE UP (ref 0–6)
GLUCOSE SERPL-MCNC: 99 MG/DL — SIGNIFICANT CHANGE UP (ref 70–99)
HCT VFR BLD CALC: 50.1 % — HIGH (ref 39–50)
HGB BLD-MCNC: 17.4 G/DL — HIGH (ref 13–17)
IMM GRANULOCYTES NFR BLD AUTO: 0.2 % — SIGNIFICANT CHANGE UP (ref 0–0.9)
LYMPHOCYTES # BLD AUTO: 3.36 K/UL — HIGH (ref 1–3.3)
LYMPHOCYTES # BLD AUTO: 38.1 % — SIGNIFICANT CHANGE UP (ref 13–44)
MCHC RBC-ENTMCNC: 31.5 PG — SIGNIFICANT CHANGE UP (ref 27–34)
MCHC RBC-ENTMCNC: 34.7 GM/DL — SIGNIFICANT CHANGE UP (ref 32–36)
MCV RBC AUTO: 90.6 FL — SIGNIFICANT CHANGE UP (ref 80–100)
MONOCYTES # BLD AUTO: 0.8 K/UL — SIGNIFICANT CHANGE UP (ref 0–0.9)
MONOCYTES NFR BLD AUTO: 9.1 % — SIGNIFICANT CHANGE UP (ref 2–14)
NEUTROPHILS # BLD AUTO: 4.49 K/UL — SIGNIFICANT CHANGE UP (ref 1.8–7.4)
NEUTROPHILS NFR BLD AUTO: 51 % — SIGNIFICANT CHANGE UP (ref 43–77)
PLATELET # BLD AUTO: 243 K/UL — SIGNIFICANT CHANGE UP (ref 150–400)
POTASSIUM SERPL-MCNC: 3.9 MMOL/L — SIGNIFICANT CHANGE UP (ref 3.5–5.3)
POTASSIUM SERPL-SCNC: 3.9 MMOL/L — SIGNIFICANT CHANGE UP (ref 3.5–5.3)
RBC # BLD: 5.53 M/UL — SIGNIFICANT CHANGE UP (ref 4.2–5.8)
RBC # FLD: 11.9 % — SIGNIFICANT CHANGE UP (ref 10.3–14.5)
SODIUM SERPL-SCNC: 139 MMOL/L — SIGNIFICANT CHANGE UP (ref 135–145)
WBC # BLD: 8.81 K/UL — SIGNIFICANT CHANGE UP (ref 3.8–10.5)
WBC # FLD AUTO: 8.81 K/UL — SIGNIFICANT CHANGE UP (ref 3.8–10.5)

## 2023-07-20 PROCEDURE — 93010 ELECTROCARDIOGRAM REPORT: CPT | Mod: 76

## 2023-07-20 RX ORDER — LEVOTHYROXINE SODIUM 125 MCG
50 TABLET ORAL DAILY
Refills: 0 | Status: DISCONTINUED | OUTPATIENT
Start: 2023-07-20 | End: 2023-07-21

## 2023-07-20 RX ORDER — LOSARTAN POTASSIUM 100 MG/1
100 TABLET, FILM COATED ORAL DAILY
Refills: 0 | Status: DISCONTINUED | OUTPATIENT
Start: 2023-07-20 | End: 2023-07-21

## 2023-07-20 RX ORDER — ATORVASTATIN CALCIUM 80 MG/1
40 TABLET, FILM COATED ORAL AT BEDTIME
Refills: 0 | Status: DISCONTINUED | OUTPATIENT
Start: 2023-07-20 | End: 2023-07-21

## 2023-07-20 RX ORDER — CLOPIDOGREL BISULFATE 75 MG/1
75 TABLET, FILM COATED ORAL DAILY
Refills: 0 | Status: DISCONTINUED | OUTPATIENT
Start: 2023-07-20 | End: 2023-07-21

## 2023-07-20 RX ORDER — APIXABAN 2.5 MG/1
5 TABLET, FILM COATED ORAL
Refills: 0 | Status: DISCONTINUED | OUTPATIENT
Start: 2023-07-20 | End: 2023-07-21

## 2023-07-20 RX ORDER — METOPROLOL TARTRATE 50 MG
25 TABLET ORAL DAILY
Refills: 0 | Status: DISCONTINUED | OUTPATIENT
Start: 2023-07-20 | End: 2023-07-21

## 2023-07-20 RX ORDER — ASPIRIN/CALCIUM CARB/MAGNESIUM 324 MG
81 TABLET ORAL DAILY
Refills: 0 | Status: DISCONTINUED | OUTPATIENT
Start: 2023-07-20 | End: 2023-07-21

## 2023-07-20 RX ADMIN — Medication 50 MICROGRAM(S): at 13:45

## 2023-07-20 RX ADMIN — LOSARTAN POTASSIUM 100 MILLIGRAM(S): 100 TABLET, FILM COATED ORAL at 13:45

## 2023-07-20 RX ADMIN — ATORVASTATIN CALCIUM 40 MILLIGRAM(S): 80 TABLET, FILM COATED ORAL at 21:30

## 2023-07-20 RX ADMIN — Medication 25 MILLIGRAM(S): at 13:45

## 2023-07-20 RX ADMIN — Medication 81 MILLIGRAM(S): at 09:33

## 2023-07-20 RX ADMIN — APIXABAN 5 MILLIGRAM(S): 2.5 TABLET, FILM COATED ORAL at 21:30

## 2023-07-20 NOTE — ASU PATIENT PROFILE, ADULT - NS SC CAGE ALCOHOL GUILTY ABOUT
From: Cj Nails  Sent: 7/14/2022 10:12 AM CDT  To: [facility] Clinical Staff  Subject: Back pain. Toña Lynn..   Thanks. Trisha Muñoz no

## 2023-07-20 NOTE — DISCHARGE NOTE PROVIDER - NSDCCPTREATMENT_GEN_ALL_CORE_FT
PRINCIPAL PROCEDURE  Procedure: Left heart cardiac cath  Findings and Treatment: s/p MALIK x 2 to mLAD, MALIK to pLAD

## 2023-07-20 NOTE — DISCHARGE NOTE PROVIDER - HOSPITAL COURSE
57 yo male with  PMHX of HTN, HLD, current smoker who was found with LV systolic dysfunction, LV apical thrombus and CAD on CTA, showing occluded LAD.  His recent ischemic evaluation showed ischemia in the LAD territory. Recent echocardiogram shows LV systolic function is mildly decreased with moderate hypokinesis of apical septal, apical anterior segments and akinesis of apical segments. There is a mobile uniformly echogenic mass measuring 1.8x1.4 cm attached to LV apex; LVEF 45%. Given the extent of ischemia and LV dysfunction he now presents for left heart catheterization to rule out underlying CAD. Now S/P PCI MALIK x 2 to mLAD, MALIK x 1 to pLAD via RFA with angioseal closure device.      58 year old male h/o HTN, HLD, +smoker, LV apical thrombus and CAD with  of LAD.  Now POD #1 s/p PCI with MALIK X3 to LAD.  DAPT. Cont eliquis.  Discharge to home

## 2023-07-20 NOTE — DISCHARGE NOTE PROVIDER - NSDCFUADDINST_GEN_ALL_CORE_FT
No heavy lifting, driving, sex, tub baths, swimming, or any activity that submerges the lower half of the body in water for 48 hours.  Limited walking and stairs for 48 hours.    Observe the site frequently.  If bleeding or a large lump (the size of a golf ball or bigger) occurs lie flat, apply continuous direct pressure just above the puncture site for at least 10 minutes, and notify your physician immediately.  If the bleeding cannot be controlled, call 911 immediately for assistance.  Notify your physician of pain, swelling or any drainage.    Notify your physician immediately if coldness, numbness, discoloration or pain in your foot occurs.

## 2023-07-20 NOTE — DISCHARGE NOTE PROVIDER - NSDCCPCAREPLAN_GEN_ALL_CORE_FT
PRINCIPAL DISCHARGE DIAGNOSIS  Diagnosis: CAD S/P percutaneous coronary angioplasty  Assessment and Plan of Treatment: MALIK X 2 mLAD, MALIK x 1 pLAD via RFA angioseal closure device     PRINCIPAL DISCHARGE DIAGNOSIS  Diagnosis: CAD S/P percutaneous coronary angioplasty  Assessment and Plan of Treatment: MALIK X 2 mLAD, MALIK x 1 pLAD   DAPT

## 2023-07-20 NOTE — DISCHARGE NOTE PROVIDER - PROVIDER TOKENS
PROVIDER:[TOKEN:[[id number]],FOLLOWUP:[2 weeks]] PROVIDER:[TOKEN:[[id number]],FOLLOWUP:[2 weeks]],PROVIDER:[TOKEN:[[id number]]]

## 2023-07-20 NOTE — DISCHARGE NOTE PROVIDER - NSDCMRMEDTOKEN_GEN_ALL_CORE_FT
Aspir 81 oral delayed release tablet: 1 orally once a day  atorvastatin 20 mg oral tablet: 1 orally once a day  Cozaar 100 mg oral tablet: 1 orally once a day  Eliquis 5 mg oral tablet: 1 orally 2 times a day  Levothroid 50 mcg (0.05 mg) oral tablet: 1 orally once a day  Metoprolol Succinate ER 25 mg oral tablet, extended release: 1 orally once a day   Aspir 81 oral delayed release tablet: 1 orally once a day  atorvastatin 40 mg oral tablet: 1 tab(s) orally once a day (at bedtime)  clopidogrel 75 mg oral tablet: 1 tab(s) orally once a day  Cozaar 100 mg oral tablet: 1 orally once a day  Eliquis 5 mg oral tablet: 1 orally 2 times a day  Levothroid 50 mcg (0.05 mg) oral tablet: 1 orally once a day  Metoprolol Succinate ER 25 mg oral tablet, extended release: 1 orally once a day

## 2023-07-20 NOTE — DISCHARGE NOTE PROVIDER - CARE PROVIDER_API CALL
Dev Santos  Cardiovascular Disease  [address], [address]  [place] [address]  Phone: [phone]  Fax: [phone]  Follow Up Time: 2 weeks   Dev Santos  Cardiovascular Disease  [address], [place] [address]  Phone: [phone]  Fax: [phone]  Follow Up Time: 2 weeks    Luis Armando Collins  Cardiovascular Disease  [address], [place] [address]  Phone: [phone]  Fax: [phone]  Follow Up Time:

## 2023-07-21 ENCOUNTER — TRANSCRIPTION ENCOUNTER (OUTPATIENT)
Age: 58
End: 2023-07-21

## 2023-07-21 VITALS — HEART RATE: 90 BPM | RESPIRATION RATE: 17 BRPM | SYSTOLIC BLOOD PRESSURE: 126 MMHG | DIASTOLIC BLOOD PRESSURE: 77 MMHG

## 2023-07-21 LAB
ANION GAP SERPL CALC-SCNC: 14 MMOL/L — SIGNIFICANT CHANGE UP (ref 5–17)
BASOPHILS # BLD AUTO: 0.02 K/UL — SIGNIFICANT CHANGE UP (ref 0–0.2)
BASOPHILS NFR BLD AUTO: 0.2 % — SIGNIFICANT CHANGE UP (ref 0–2)
BLD GP AB SCN SERPL QL: SIGNIFICANT CHANGE UP
BUN SERPL-MCNC: 17.4 MG/DL — SIGNIFICANT CHANGE UP (ref 8–20)
CALCIUM SERPL-MCNC: 9.1 MG/DL — SIGNIFICANT CHANGE UP (ref 8.4–10.5)
CHLORIDE SERPL-SCNC: 99 MMOL/L — SIGNIFICANT CHANGE UP (ref 96–108)
CO2 SERPL-SCNC: 22 MMOL/L — SIGNIFICANT CHANGE UP (ref 22–29)
CREAT SERPL-MCNC: 0.9 MG/DL — SIGNIFICANT CHANGE UP (ref 0.5–1.3)
EGFR: 99 ML/MIN/1.73M2 — SIGNIFICANT CHANGE UP
EOSINOPHIL # BLD AUTO: 0.09 K/UL — SIGNIFICANT CHANGE UP (ref 0–0.5)
EOSINOPHIL NFR BLD AUTO: 1 % — SIGNIFICANT CHANGE UP (ref 0–6)
GLUCOSE SERPL-MCNC: 99 MG/DL — SIGNIFICANT CHANGE UP (ref 70–99)
HCT VFR BLD CALC: 46.6 % — SIGNIFICANT CHANGE UP (ref 39–50)
HGB BLD-MCNC: 16.6 G/DL — SIGNIFICANT CHANGE UP (ref 13–17)
IMM GRANULOCYTES NFR BLD AUTO: 0.2 % — SIGNIFICANT CHANGE UP (ref 0–0.9)
LYMPHOCYTES # BLD AUTO: 2.58 K/UL — SIGNIFICANT CHANGE UP (ref 1–3.3)
LYMPHOCYTES # BLD AUTO: 29 % — SIGNIFICANT CHANGE UP (ref 13–44)
MCHC RBC-ENTMCNC: 31.7 PG — SIGNIFICANT CHANGE UP (ref 27–34)
MCHC RBC-ENTMCNC: 35.6 GM/DL — SIGNIFICANT CHANGE UP (ref 32–36)
MCV RBC AUTO: 89.1 FL — SIGNIFICANT CHANGE UP (ref 80–100)
MONOCYTES # BLD AUTO: 0.63 K/UL — SIGNIFICANT CHANGE UP (ref 0–0.9)
MONOCYTES NFR BLD AUTO: 7.1 % — SIGNIFICANT CHANGE UP (ref 2–14)
NEUTROPHILS # BLD AUTO: 5.56 K/UL — SIGNIFICANT CHANGE UP (ref 1.8–7.4)
NEUTROPHILS NFR BLD AUTO: 62.5 % — SIGNIFICANT CHANGE UP (ref 43–77)
PLATELET # BLD AUTO: 216 K/UL — SIGNIFICANT CHANGE UP (ref 150–400)
POTASSIUM SERPL-MCNC: 4.2 MMOL/L — SIGNIFICANT CHANGE UP (ref 3.5–5.3)
POTASSIUM SERPL-SCNC: 4.2 MMOL/L — SIGNIFICANT CHANGE UP (ref 3.5–5.3)
RBC # BLD: 5.23 M/UL — SIGNIFICANT CHANGE UP (ref 4.2–5.8)
RBC # FLD: 11.9 % — SIGNIFICANT CHANGE UP (ref 10.3–14.5)
SODIUM SERPL-SCNC: 135 MMOL/L — SIGNIFICANT CHANGE UP (ref 135–145)
WBC # BLD: 8.9 K/UL — SIGNIFICANT CHANGE UP (ref 3.8–10.5)
WBC # FLD AUTO: 8.9 K/UL — SIGNIFICANT CHANGE UP (ref 3.8–10.5)

## 2023-07-21 PROCEDURE — 86901 BLOOD TYPING SEROLOGIC RH(D): CPT

## 2023-07-21 PROCEDURE — C1769: CPT

## 2023-07-21 PROCEDURE — 93005 ELECTROCARDIOGRAM TRACING: CPT

## 2023-07-21 PROCEDURE — C1887: CPT

## 2023-07-21 PROCEDURE — 86850 RBC ANTIBODY SCREEN: CPT

## 2023-07-21 PROCEDURE — 85025 COMPLETE CBC W/AUTO DIFF WBC: CPT

## 2023-07-21 PROCEDURE — C9600: CPT | Mod: LD

## 2023-07-21 PROCEDURE — C1874: CPT

## 2023-07-21 PROCEDURE — C1760: CPT

## 2023-07-21 PROCEDURE — 93010 ELECTROCARDIOGRAM REPORT: CPT

## 2023-07-21 PROCEDURE — C1894: CPT

## 2023-07-21 PROCEDURE — 86900 BLOOD TYPING SEROLOGIC ABO: CPT

## 2023-07-21 PROCEDURE — C1725: CPT

## 2023-07-21 PROCEDURE — 93454 CORONARY ARTERY ANGIO S&I: CPT | Mod: 59

## 2023-07-21 PROCEDURE — 80048 BASIC METABOLIC PNL TOTAL CA: CPT

## 2023-07-21 PROCEDURE — 36415 COLL VENOUS BLD VENIPUNCTURE: CPT

## 2023-07-21 RX ORDER — ATORVASTATIN CALCIUM 80 MG/1
1 TABLET, FILM COATED ORAL
Refills: 0 | DISCHARGE

## 2023-07-21 RX ORDER — ATORVASTATIN CALCIUM 80 MG/1
1 TABLET, FILM COATED ORAL
Qty: 90 | Refills: 3
Start: 2023-07-21 | End: 2024-07-14

## 2023-07-21 RX ORDER — CLOPIDOGREL BISULFATE 75 MG/1
1 TABLET, FILM COATED ORAL
Qty: 90 | Refills: 3
Start: 2023-07-21 | End: 2024-07-14

## 2023-07-21 RX ADMIN — Medication 25 MILLIGRAM(S): at 05:27

## 2023-07-21 RX ADMIN — Medication 50 MICROGRAM(S): at 05:27

## 2023-07-21 RX ADMIN — LOSARTAN POTASSIUM 100 MILLIGRAM(S): 100 TABLET, FILM COATED ORAL at 05:27

## 2023-07-21 RX ADMIN — APIXABAN 5 MILLIGRAM(S): 2.5 TABLET, FILM COATED ORAL at 08:31

## 2023-07-21 NOTE — DISCHARGE NOTE NURSING/CASE MANAGEMENT/SOCIAL WORK - NSDCPEEMAIL_GEN_ALL_CORE
[facility] email [email or web address]

## 2023-07-21 NOTE — PROGRESS NOTE ADULT - SUBJECTIVE AND OBJECTIVE BOX
Subjective:  57 yo male with  PMHX of HTN, HLD, current smoker who was found with LV systolic dysfunction, LV apical thrombus and CAD on CTA, showing occluded LAD.  His recent ischemic evaluation showed ischemia in the LAD territory. Recent echocardiogram shows LV systolic function is mildly decreased with moderate hypokinesis of apical septal, apical anterior segments and akinesis of apical segments. There is a mobile uniformly echogenic mass measuring 1.8x1.4 cm attached to LV apex; LVEF 45%. Given the extent of ischemia and LV dysfunction he now presents for left heart catheterization to rule out underlying CAD. Now S/P PCI MALIK x 2 to mLAD, MALIK x 1 to pLAD via RFA with angioseal closure device. Patient awake and alert without complaints. Denies chest pain, sob, palps.      PAST MEDICAL & SURGICAL HISTORY:  Shingles  rt back, 2 yrs ago      No significant past surgical history        FAMILY HISTORY:  No pertinent family history in first degree relatives      MEDICATIONS  (STANDING):  aspirin  chewable 81 milliGRAM(s) Oral daily  chlorhexidine 4% Liquid 1 Application(s) Topical Once    MEDICATIONS  (PRN):    Patient is a 58y old  Male who presents with a chief complaint of Left  Heart Catheterization (20 Jul 2023 11:18)    HEALTH ISSUES - PROBLEM Dx:            General: No fatigue, no fevers/chills  Respiratory: No dyspnea, no cough, no wheeze  CV: No chest pain, no palpitations  Abd: No nausea  Neuro: No headache, no dizziness  No Known Allergies      Objective:  Vital Signs Last 24 Hrs  T(C): 36.7 (20 Jul 2023 08:55), Max: 36.7 (20 Jul 2023 08:55)  T(F): 98.1 (20 Jul 2023 08:55), Max: 98.1 (20 Jul 2023 08:55)  HR: 70 (20 Jul 2023 11:10) (70 - 86)  BP: 129/75 (20 Jul 2023 11:10) (129/75 - 146/80)  BP(mean): --  RR: 16 (20 Jul 2023 11:10) (16 - 16)  SpO2: 97% (20 Jul 2023 11:10) (97% - 100%)    Parameters below as of 20 Jul 2023 11:10  Patient On (Oxygen Delivery Method): room air    EKG: NSR no acute ST changes                          17.4   8.81  )-----------( 243      ( 20 Jul 2023 08:49 )             50.1     07-20    139  |  98  |  20.5<H>  ----------------------------<  99  3.9   |  28.0  |  1.18    Ca    9.4      20 Jul 2023 08:49          A/P: CAD  s/p PCI: MALIK x 2 to mLAD, MALIK to pLAD  1.                 Groin management discussed with patient.  2.                 Post procedure EKG reviewed and stable.    3.                 Pt given instructions on importance of taking antiplatelet medication.    4.                 Aspirin/Plavix/Statin/BB/ACE  5.                 Follow up with cardiologist in 2 weeks or sooner if needed  6.                 Bedrest x 3  hours  7.                 Cardiac rehab info provided/referral and communication to cardiac rehab completed   8.                 Probable discharge in am
                                                                        Department of Cardiology                                                                  [facility] [address] [place] [address]                                                            Telephone: [phone]. Fax:[phone]                                                                             Progress Note      HPI:  58 year old male h/o HTN, HLD, +smoker, LV apical thrombus and CAD with  of LAD.  Now POD #1 s/p PCI with MALIK X3 to LAD.             MEDICATIONS:  losartan 100 milliGRAM(s) Oral daily  metoprolol succinate ER 25 milliGRAM(s) Oral daily  atorvastatin 40 milliGRAM(s) Oral at bedtime  levothyroxine 50 MICROGram(s) Oral daily  apixaban 5 milliGRAM(s) Oral two times a day  aspirin  chewable 81 milliGRAM(s) Oral daily  chlorhexidine 4% Liquid 1 Application(s) Topical Once  clopidogrel Tablet 75 milliGRAM(s) Oral daily        PHYSICAL EXAM:  Constitutional: A & O x 3  HEENT:   Normal oral mucosa, PERRL, EOMI	  Cardiovascular: Normal S1 S2, No JVD, No murmurs, No edema  Respiratory: Lungs clear to auscultation	  Gastrointestinal:  Soft, Non-tender, + BS	  Skin: No rashes, No ecchymoses, No cyanosis  Neurologic: Non-focal  Extremities: Normal range of motion, No clubbing, cyanosis or edema  Vascular: Peripheral pulses palpable 2+ bilaterally  Site check: Right groin dressing removed.  Site slightly ecchymotic.  No bleeding/hematoma      T(C): 36.4 (07-21-23 @ 00:00), Max: 36.7 (07-20-23 @ 08:55)  HR: 65 (07-21-23 @ 04:00) (62 - 96)  BP: 121/82 (07-21-23 @ 04:00) (107/70 - 146/80)  RR: 16 (07-21-23 @ 04:00) (14 - 16)  SpO2: 97% (07-21-23 @ 04:00) (95% - 100%)  Wt(kg): --        TELEMETRY: 	  NSR reviewed    ECG:  	NSR 74 bpm.  No acute changes    LABS:	 	                            16.6   8.90  )-----------( 216      ( 21 Jul 2023 04:15 )             46.6     07-21    135  |  99  |  17.4  ----------------------------<  99  4.2   |  22.0  |  0.90    Ca    9.1      21 Jul 2023 04:15

## 2023-07-21 NOTE — PROGRESS NOTE ADULT - ASSESSMENT
58 year old male POD #1 s/p PCI with MALIK X3 LAD via right femoral artery. No complications.     -Groin precautions reviewed  -DAPT with plavix/aspirin  -Cont eliquis  -Smoking cessation  -BB/Statin/Ace  -f/u Dr. Santos  -f/u Dr. Collins  -Discharge to home

## 2023-07-21 NOTE — DISCHARGE NOTE NURSING/CASE MANAGEMENT/SOCIAL WORK - NSDCPEWEB_GEN_ALL_CORE
[facility] for Tobacco Control website --- [email or web address] website --- [email or web address]

## 2023-07-21 NOTE — DISCHARGE NOTE NURSING/CASE MANAGEMENT/SOCIAL WORK - PATIENT PORTAL LINK FT
You can access the FollowMyHealth Patient Portal offered by [facility] by registering at the following website: [email or web address]. By joining [employer]’s FollowMyHealth portal, you will also be able to view your health information using other applications (apps) compatible with our system.

## 2023-07-24 ENCOUNTER — NON-APPOINTMENT (OUTPATIENT)
Age: 58
End: 2023-07-24

## 2023-10-04 NOTE — PHYSICAL EXAM
This writer discussed the patients current presentation and recommended discharge plan with Dr. Ana Spence. They agree with the patient being discharged at this time with referrals and/or information about outpatient. The patient was Instructed to follow up with their PCP in [place]. The patient was provided with referral information for:   [facility] walk-in hours and outpatient fee-for-service options. The patient declined to complete a safety plan however a blank plan was provided for future use. In addition, the patient was instructed to call local [facility] crisis, other crisis services, [facility] or to go to the nearest ER immediately if their situation changes at any time. This writer discussed discharge plans with the patient and family, who agrees with and understands the discharge plans.          National Suicide Prevention Hotline:  [facility] [address] [place] [address] [phone] - LVF Crisis/Mobile Crisis   [address]: [phone]  [facility]: [address] [place] [address] [address] [place] [address] [phone] - Crisis   [phone] - Peer Support Talk Line (1-9pm daily)  [phone] - Teen Support Talk Line (1-9pm daily)  [address] [place] [address] [address] [place] [address] ([place]) [phone] - [facility] [Normal] : normal rate, regular rhythm, normal S1 and S2 and no murmur heard [No Varicosities] : no varicosities [Pedal Pulses Present] : the pedal pulses are present [No Edema] : there was no peripheral edema [No Extremity Clubbing/Cyanosis] : no extremity clubbing/cyanosis [Soft] : abdomen soft [Non-distended] : non-distended [Normal Bowel Sounds] : normal bowel sounds [No CVA Tenderness] : no CVA  tenderness [No Rash] : no rash [No Focal Deficits] : no focal deficits [de-identified] : Tenderness to palpation in left lower quadrant.  No definite mass or distention appreciated

## 2023-11-09 RX ORDER — ATORVASTATIN CALCIUM 40 MG/1
40 TABLET, FILM COATED ORAL
Qty: 90 | Refills: 3 | Status: ACTIVE | COMMUNITY
Start: 2023-03-29 | End: 1900-01-01

## 2024-01-15 ENCOUNTER — RX RENEWAL (OUTPATIENT)
Age: 59
End: 2024-01-15

## 2024-01-15 RX ORDER — LOSARTAN POTASSIUM 100 MG/1
100 TABLET, FILM COATED ORAL
Qty: 90 | Refills: 3 | Status: ACTIVE | COMMUNITY
Start: 2021-05-25 | End: 1900-01-01

## 2024-02-14 ENCOUNTER — RX RENEWAL (OUTPATIENT)
Age: 59
End: 2024-02-14

## 2024-02-14 RX ORDER — LEVOTHYROXINE SODIUM 0.05 MG/1
50 TABLET ORAL
Qty: 90 | Refills: 3 | Status: ACTIVE | COMMUNITY
Start: 2024-02-14 | End: 1900-01-01

## 2024-04-15 ENCOUNTER — APPOINTMENT (OUTPATIENT)
Dept: INTERNAL MEDICINE | Facility: CLINIC | Age: 59
End: 2024-04-15
Payer: COMMERCIAL

## 2024-04-15 ENCOUNTER — NON-APPOINTMENT (OUTPATIENT)
Age: 59
End: 2024-04-15

## 2024-04-15 VITALS
DIASTOLIC BLOOD PRESSURE: 76 MMHG | HEIGHT: 70 IN | HEART RATE: 80 BPM | WEIGHT: 234.38 LBS | BODY MASS INDEX: 33.55 KG/M2 | SYSTOLIC BLOOD PRESSURE: 111 MMHG | OXYGEN SATURATION: 97 %

## 2024-04-15 DIAGNOSIS — Z00.00 ENCOUNTER FOR GENERAL ADULT MEDICAL EXAMINATION W/OUT ABNORMAL FINDINGS: ICD-10-CM

## 2024-04-15 DIAGNOSIS — Z95.5 PRESENCE OF CORONARY ANGIOPLASTY IMPLANT AND GRAFT: ICD-10-CM

## 2024-04-15 DIAGNOSIS — H91.90 UNSPECIFIED HEARING LOSS, UNSPECIFIED EAR: ICD-10-CM

## 2024-04-15 DIAGNOSIS — I25.118 ATHEROSCLEROTIC HEART DISEASE OF NATIVE CORONARY ARTERY WITH OTHER FORMS OF ANGINA PECTORIS: ICD-10-CM

## 2024-04-15 PROCEDURE — 36415 COLL VENOUS BLD VENIPUNCTURE: CPT

## 2024-04-15 PROCEDURE — 99396 PREV VISIT EST AGE 40-64: CPT

## 2024-04-15 PROCEDURE — 93000 ELECTROCARDIOGRAM COMPLETE: CPT

## 2024-04-15 RX ORDER — LEVOTHYROXINE SODIUM 50 UG/1
50 TABLET ORAL
Qty: 90 | Refills: 3 | Status: DISCONTINUED | COMMUNITY
Start: 2022-10-20 | End: 2024-04-15

## 2024-04-15 RX ORDER — FAMOTIDINE 20 MG/1
20 TABLET, FILM COATED ORAL
Qty: 1 | Refills: 3 | Status: DISCONTINUED | COMMUNITY
Start: 2022-11-08 | End: 2024-04-15

## 2024-04-15 RX ORDER — OMEPRAZOLE 40 MG/1
40 CAPSULE, DELAYED RELEASE ORAL
Qty: 90 | Refills: 1 | Status: DISCONTINUED | COMMUNITY
Start: 2022-09-08 | End: 2024-04-15

## 2024-04-15 RX ORDER — CLOPIDOGREL BISULFATE 75 MG/1
75 TABLET, FILM COATED ORAL
Qty: 90 | Refills: 0 | Status: ACTIVE | COMMUNITY
Start: 2024-03-12

## 2024-04-15 RX ORDER — METOPROLOL SUCCINATE 50 MG/1
50 TABLET, EXTENDED RELEASE ORAL
Qty: 90 | Refills: 0 | Status: ACTIVE | COMMUNITY
Start: 2024-03-12

## 2024-04-15 NOTE — HEALTH RISK ASSESSMENT
[Good] : ~his/her~  mood as  good [No falls in past year] : Patient reported no falls in the past year [0] : 2) Feeling down, depressed, or hopeless: Not at all (0) [PHQ-2 Negative - No further assessment needed] : PHQ-2 Negative - No further assessment needed [[id number]] : 0 [ColonoscopyDate] : 2022 [Former] : Former [15-19] : 15-19 [< 15 Years] : < 15 Years

## 2024-04-15 NOTE — PHYSICAL EXAM
[Normal] : normal rate, regular rhythm, normal S1 and S2 and no murmur heard [No Varicosities] : no varicosities [Pedal Pulses Present] : the pedal pulses are present [No Edema] : there was no peripheral edema [No Extremity Clubbing/Cyanosis] : no extremity clubbing/cyanosis [Soft] : abdomen soft [Non-distended] : non-distended [Normal Bowel Sounds] : normal bowel sounds [Normal Posterior Cervical Nodes] : no posterior cervical lymphadenopathy [Normal Anterior Cervical Nodes] : no anterior cervical lymphadenopathy [No CVA Tenderness] : no CVA  tenderness [No Joint Swelling] : no joint swelling [No Rash] : no rash [No Focal Deficits] : no focal deficits [Normal Affect] : the affect was normal [Normal Mood] : the mood was normal

## 2024-04-15 NOTE — ASSESSMENT
[FreeTextEntry1] : //  Snoring, obese, increase neck circum, redundant palate.  high risk for ANTONIO  -home sleep study  CAD, s/p cardiac stent x3 in 2023.  follows with [facility] -cont aspirin/plavix, metoprolol, atorvastatin, losartan -f/u as per cardio  Former smoker, last CT chest - small nodule -repeat LDCT  h/o tubular andenoma in 2022.   -f/u as per GI   HTN, stable  -Continue losartan, metoprolol as directed  Hyperlipidemia with hyper-triglyceridemia, -cont statin  -Will check labs  -Advised decrease greasy, fatty foods, increase exercise, fiber intake  -Elevated cholesterol has been linked to increase in cardiovascular even such as heart attack, stroke, peripheral artery disease and death  Obese -Being overweight increases your risk of health conditions such as heart disease, high blood pressure, type 2 diabetes, and certain types of cancer. It can also increase your risk for osteoarthritis, sleep apnea, and other respiratory problems. Aim for a slow, steady weight loss. Even a small amount of weight loss can lower your risk of health problems.  Healthcare Maintenance -Advise Yearly Skin cancer screening with Dermatologist  -Advise Yearly Eye exam with Ophthalmologist -Advise Yearly Dental exam -Educated of the importance of Healthy diet, such as Mediterranean Diet and Exercise, such as walking >20 minutes a day and increasing gradually as tolerated  Immunizations -Flu vaccine  -Covid vaccine  -Discussed shingles vaccine (shingrix), advised to verify with insurance if its covered through medical or prescription plan  Preventative screening  -advised to get colonoscopy for colon cancer screening -up to date  -will check PSA for prostate cancer screening -labs drawn

## 2024-04-15 NOTE — HISTORY OF PRESENT ILLNESS
[de-identified] : 59 year old male with h/o CAD s/p stent placement bx3, Hypertension, hyperlipidemia, presents for annual exam.    recent cardiac stent placement x3 last year following abnormal ecg.  follows with [facility] former smoker, last CT chest - small nodule  h/o tubular andenoma in 2022.    h/o HTN, on metoprolol, losartan.      Denies any coughing, shortness of breath or chest pain.   retired  former smoker, h/o nicotine dependence, stopped smoking 1 pack/day about 5 years ago.  Occasionally relapses smoking for 2-3 days in a row then stops.

## 2024-04-16 ENCOUNTER — CLINICAL ADVICE (OUTPATIENT)
Age: 59
End: 2024-04-16

## 2024-04-16 LAB
ALBUMIN SERPL ELPH-MCNC: 4.5 G/DL
ALP BLD-CCNC: 106 U/L
ALT SERPL-CCNC: 42 U/L
ANION GAP SERPL CALC-SCNC: 13 MMOL/L
APPEARANCE: CLEAR
AST SERPL-CCNC: 19 U/L
BACTERIA: NEGATIVE /HPF
BASOPHILS # BLD AUTO: 0.02 K/UL
BASOPHILS NFR BLD AUTO: 0.3 %
BILIRUB DIRECT SERPL-MCNC: 0.2 MG/DL
BILIRUB INDIRECT SERPL-MCNC: 0.6 MG/DL
BILIRUB SERPL-MCNC: 0.8 MG/DL
BILIRUBIN URINE: NEGATIVE
BLOOD URINE: NEGATIVE
BUN SERPL-MCNC: 13 MG/DL
CALCIUM SERPL-MCNC: 9.3 MG/DL
CAST: 0 /LPF
CHLORIDE SERPL-SCNC: 103 MMOL/L
CHOLEST SERPL-MCNC: 142 MG/DL
CO2 SERPL-SCNC: 24 MMOL/L
COLOR: YELLOW
CREAT SERPL-MCNC: 1.21 MG/DL
EGFR: 69 ML/MIN/1.73M2
EOSINOPHIL # BLD AUTO: 0.08 K/UL
EOSINOPHIL NFR BLD AUTO: 1.3 %
EPITHELIAL CELLS: 0 /HPF
ESTIMATED AVERAGE GLUCOSE: 120 MG/DL
FERRITIN SERPL-MCNC: 456 NG/ML
FOLATE SERPL-MCNC: 7.6 NG/ML
GLUCOSE QUALITATIVE U: NEGATIVE MG/DL
GLUCOSE SERPL-MCNC: 111 MG/DL
HBA1C MFR BLD HPLC: 5.8 %
HCT VFR BLD CALC: 48.1 %
HDLC SERPL-MCNC: 36 MG/DL
HGB BLD-MCNC: 16.5 G/DL
IMM GRANULOCYTES NFR BLD AUTO: 0.2 %
IRON SATN MFR SERPL: 51 %
IRON SERPL-MCNC: 143 UG/DL
KETONES URINE: NEGATIVE MG/DL
LDLC SERPL CALC-MCNC: 78 MG/DL
LEUKOCYTE ESTERASE URINE: NEGATIVE
LYMPHOCYTES # BLD AUTO: 2.02 K/UL
LYMPHOCYTES NFR BLD AUTO: 33.9 %
MAN DIFF?: NORMAL
MCHC RBC-ENTMCNC: 31 PG
MCHC RBC-ENTMCNC: 34.3 GM/DL
MCV RBC AUTO: 90.4 FL
MICROSCOPIC-UA: NORMAL
MONOCYTES # BLD AUTO: 0.51 K/UL
MONOCYTES NFR BLD AUTO: 8.6 %
NEUTROPHILS # BLD AUTO: 3.32 K/UL
NEUTROPHILS NFR BLD AUTO: 55.7 %
NITRITE URINE: NEGATIVE
NONHDLC SERPL-MCNC: 107 MG/DL
PH URINE: 5.5
PLATELET # BLD AUTO: 237 K/UL
POTASSIUM SERPL-SCNC: 4.6 MMOL/L
PROT SERPL-MCNC: 6.6 G/DL
PROTEIN URINE: NEGATIVE MG/DL
PSA SERPL-MCNC: 0.5 NG/ML
RBC # BLD: 5.32 M/UL
RBC # FLD: 13.1 %
RED BLOOD CELLS URINE: 0 /HPF
SODIUM SERPL-SCNC: 140 MMOL/L
SPECIFIC GRAVITY URINE: 1.02
TIBC SERPL-MCNC: 278 UG/DL
TRIGL SERPL-MCNC: 168 MG/DL
TSH SERPL-ACNC: 3.27 UIU/ML
UIBC SERPL-MCNC: 135 UG/DL
UROBILINOGEN URINE: 0.2 MG/DL
VIT B12 SERPL-MCNC: 516 PG/ML
WBC # FLD AUTO: 5.96 K/UL
WHITE BLOOD CELLS URINE: 0 /HPF

## 2024-04-18 ENCOUNTER — TRANSCRIPTION ENCOUNTER (OUTPATIENT)
Age: 59
End: 2024-04-18

## 2024-04-26 ENCOUNTER — NON-APPOINTMENT (OUTPATIENT)
Age: 59
End: 2024-04-26

## 2024-04-26 VITALS — WEIGHT: 234 LBS | BODY MASS INDEX: 33.5 KG/M2 | HEIGHT: 70 IN

## 2024-04-26 DIAGNOSIS — Z87.891 PERSONAL HISTORY OF NICOTINE DEPENDENCE: ICD-10-CM

## 2024-04-26 NOTE — HISTORY OF PRESENT ILLNESS
[Former] : Former [TextBox_13] : Referred by Dr. Swanson  Mr. CAT LANE is a 59 year old man with a history of nicotine dependence   Reviewed and confirmed that the patient meets screening eligibility criteria:  Smoking status:  -Former smoker, [id number] years, quit 2017  Mr. LANE denies any personal history of lung cancer. Denies any s/s of lung cancer. No lung cancer in a 1st degree relative. Denies any history of lung disease. Denies any history of occupational exposures  [YearQuit] : 2017 [PacksperDay] : 1 [N_Years] : 27 [PacksperYear] : 27

## 2024-05-20 ENCOUNTER — OUTPATIENT (OUTPATIENT)
Dept: OUTPATIENT SERVICES | Facility: HOSPITAL | Age: 59
LOS: 1 days | End: 2024-05-20
Payer: COMMERCIAL

## 2024-05-20 ENCOUNTER — OUTPATIENT (OUTPATIENT)
Dept: OUTPATIENT SERVICES | Facility: HOSPITAL | Age: 59
LOS: 1 days | End: 2024-05-20

## 2024-05-20 ENCOUNTER — APPOINTMENT (OUTPATIENT)
Dept: CT IMAGING | Facility: IMAGING CENTER | Age: 59
End: 2024-05-20
Payer: COMMERCIAL

## 2024-05-20 DIAGNOSIS — Z00.00 ENCOUNTER FOR GENERAL ADULT MEDICAL EXAMINATION WITHOUT ABNORMAL FINDINGS: ICD-10-CM

## 2024-05-20 DIAGNOSIS — Z00.8 ENCOUNTER FOR OTHER GENERAL EXAMINATION: ICD-10-CM

## 2024-05-20 PROCEDURE — 71271 CT THORAX LUNG CANCER SCR C-: CPT

## 2024-05-20 PROCEDURE — 71271 CT THORAX LUNG CANCER SCR C-: CPT | Mod: 26

## 2024-05-23 ENCOUNTER — APPOINTMENT (OUTPATIENT)
Dept: DERMATOLOGY | Facility: CLINIC | Age: 59
End: 2024-05-23
Payer: COMMERCIAL

## 2024-05-23 DIAGNOSIS — L82.1 OTHER SEBORRHEIC KERATOSIS: ICD-10-CM

## 2024-05-23 DIAGNOSIS — D22.9 MELANOCYTIC NEVI, UNSPECIFIED: ICD-10-CM

## 2024-05-23 DIAGNOSIS — D18.01 HEMANGIOMA OF SKIN AND SUBCUTANEOUS TISSUE: ICD-10-CM

## 2024-05-23 DIAGNOSIS — Z12.83 ENCOUNTER FOR SCREENING FOR MALIGNANT NEOPLASM OF SKIN: ICD-10-CM

## 2024-05-23 PROCEDURE — 99203 OFFICE O/P NEW LOW 30 MIN: CPT

## 2024-05-23 NOTE — PHYSICAL EXAM
[Alert] : alert [Oriented x 3] : ~L oriented x 3 [Well Nourished] : well nourished [Conjunctiva Non-injected] : conjunctiva non-injected [No Visual Lymphadenopathy] : no visual  lymphadenopathy [No Clubbing] : no clubbing [No Edema] : no edema [No Bromhidrosis] : no bromhidrosis [No Chromhidrosis] : no chromhidrosis [FreeTextEntry3] : Full body skin exam Total body skin exam performed including hair, scalp, face, neck, chest/breasts/axillae, abdomen, back, R upper ext, L upper ext, R lower ext, L lower ext, hands, feet, digits/nails and eccrine/apocrine glands. The following of note   buttock/groin exam declined brown macules scattered throughout trunk/extremities with benign features on dermoscopy brown greasy stuck-on papules scattered throughout trunk/extremities vascular red papules scattered throughout trunk/extremities

## 2024-05-23 NOTE — HISTORY OF PRESENT ILLNESS
[FreeTextEntry1] : NP scalp concern [de-identified] : 05/23/2024 02:41 PM CAT ARIANA is a 59 year M presenting today for evaluation of the following:  # brown spots on body x years, sometimes scratches them off then they grow back, asx  # TBSE  No history of skin cancer No family history of skin cancer No medication allergies

## 2024-05-28 ENCOUNTER — CLINICAL ADVICE (OUTPATIENT)
Age: 59
End: 2024-05-28

## 2024-05-30 ENCOUNTER — APPOINTMENT (OUTPATIENT)
Dept: OTOLARYNGOLOGY | Facility: CLINIC | Age: 59
End: 2024-05-30
Payer: COMMERCIAL

## 2024-05-30 ENCOUNTER — NON-APPOINTMENT (OUTPATIENT)
Age: 59
End: 2024-05-30

## 2024-05-30 VITALS
TEMPERATURE: 96.4 F | BODY MASS INDEX: 33.64 KG/M2 | HEART RATE: 86 BPM | WEIGHT: 235 LBS | SYSTOLIC BLOOD PRESSURE: 112 MMHG | HEIGHT: 70 IN | DIASTOLIC BLOOD PRESSURE: 66 MMHG

## 2024-05-30 DIAGNOSIS — R42 DIZZINESS AND GIDDINESS: ICD-10-CM

## 2024-05-30 DIAGNOSIS — H90.3 SENSORINEURAL HEARING LOSS, BILATERAL: ICD-10-CM

## 2024-05-30 DIAGNOSIS — J34.2 DEVIATED NASAL SEPTUM: ICD-10-CM

## 2024-05-30 PROCEDURE — 92567 TYMPANOMETRY: CPT

## 2024-05-30 PROCEDURE — 31231 NASAL ENDOSCOPY DX: CPT

## 2024-05-30 PROCEDURE — 99203 OFFICE O/P NEW LOW 30 MIN: CPT | Mod: 25

## 2024-05-30 PROCEDURE — 92557 COMPREHENSIVE HEARING TEST: CPT

## 2024-05-30 RX ORDER — ASPIRIN 81 MG
81 TABLET, DELAYED RELEASE (ENTERIC COATED) ORAL
Refills: 0 | Status: ACTIVE | COMMUNITY

## 2024-05-30 NOTE — END OF VISIT
[FreeTextEntry3] : I, Dr. Marquez personally performed the evaluation and management (E/M) services including all necessary procedures, for this new patient. That E/M includes conducting the clinically appropriate initial history &/or exam, assessing all conditions, and establishing the plan of care. Today, my TOMMY, Elsie Garcia, was here to observe &/or participate in the visit & follow plan of care established by me.

## 2024-05-30 NOTE — DATA REVIEWED
[No studies available for review at this time] : No studies available for review at this time
no concerns

## 2024-05-30 NOTE — HISTORY OF PRESENT ILLNESS
[de-identified] : Patient comes in with recent changes in hearing, turns the TV up loudly. He does not have any pain in the ears, itching in the ears or ringing in the ears. He does have occasional dizziness,  He does have a  history of loud noise exposure over the years He denies nasal congestion or runny nose

## 2024-05-30 NOTE — ASSESSMENT
[FreeTextEntry1] : Patient here to check his hearing family is complaining is not listening on examination no wax audiogram shows essentially normal hearing he was reassured no further acute interventions indicated follow-up with us as needed.

## 2024-06-14 ENCOUNTER — OUTPATIENT (OUTPATIENT)
Dept: OUTPATIENT SERVICES | Facility: HOSPITAL | Age: 59
LOS: 1 days | End: 2024-06-14
Payer: COMMERCIAL

## 2024-06-14 ENCOUNTER — TRANSCRIPTION ENCOUNTER (OUTPATIENT)
Age: 59
End: 2024-06-14

## 2024-06-14 VITALS
SYSTOLIC BLOOD PRESSURE: 152 MMHG | WEIGHT: 235.01 LBS | OXYGEN SATURATION: 96 % | TEMPERATURE: 100 F | DIASTOLIC BLOOD PRESSURE: 89 MMHG | HEART RATE: 86 BPM | RESPIRATION RATE: 18 BRPM | HEIGHT: 70 IN

## 2024-06-14 VITALS
DIASTOLIC BLOOD PRESSURE: 81 MMHG | SYSTOLIC BLOOD PRESSURE: 136 MMHG | OXYGEN SATURATION: 93 % | HEART RATE: 75 BPM | RESPIRATION RATE: 18 BRPM

## 2024-06-14 DIAGNOSIS — Z95.5 PRESENCE OF CORONARY ANGIOPLASTY IMPLANT AND GRAFT: Chronic | ICD-10-CM

## 2024-06-14 DIAGNOSIS — R07.89 OTHER CHEST PAIN: ICD-10-CM

## 2024-06-14 LAB
ANION GAP SERPL CALC-SCNC: 14 MMOL/L — SIGNIFICANT CHANGE UP (ref 5–17)
BUN SERPL-MCNC: 19 MG/DL — SIGNIFICANT CHANGE UP (ref 7–23)
CALCIUM SERPL-MCNC: 9.2 MG/DL — SIGNIFICANT CHANGE UP (ref 8.4–10.5)
CHLORIDE SERPL-SCNC: 104 MMOL/L — SIGNIFICANT CHANGE UP (ref 96–108)
CO2 SERPL-SCNC: 21 MMOL/L — LOW (ref 22–31)
CREAT SERPL-MCNC: 1.04 MG/DL — SIGNIFICANT CHANGE UP (ref 0.5–1.3)
EGFR: 83 ML/MIN/1.73M2 — SIGNIFICANT CHANGE UP
GLUCOSE SERPL-MCNC: 113 MG/DL — HIGH (ref 70–99)
HCT VFR BLD CALC: 45.8 % — SIGNIFICANT CHANGE UP (ref 39–50)
HGB BLD-MCNC: 15.9 G/DL — SIGNIFICANT CHANGE UP (ref 13–17)
MCHC RBC-ENTMCNC: 31.3 PG — SIGNIFICANT CHANGE UP (ref 27–34)
MCHC RBC-ENTMCNC: 34.7 GM/DL — SIGNIFICANT CHANGE UP (ref 32–36)
MCV RBC AUTO: 90.2 FL — SIGNIFICANT CHANGE UP (ref 80–100)
NRBC # BLD: 0 /100 WBCS — SIGNIFICANT CHANGE UP (ref 0–0)
PLATELET # BLD AUTO: 217 K/UL — SIGNIFICANT CHANGE UP (ref 150–400)
POTASSIUM SERPL-MCNC: 4.1 MMOL/L — SIGNIFICANT CHANGE UP (ref 3.5–5.3)
POTASSIUM SERPL-SCNC: 4.1 MMOL/L — SIGNIFICANT CHANGE UP (ref 3.5–5.3)
RBC # BLD: 5.08 M/UL — SIGNIFICANT CHANGE UP (ref 4.2–5.8)
RBC # FLD: 12 % — SIGNIFICANT CHANGE UP (ref 10.3–14.5)
SODIUM SERPL-SCNC: 139 MMOL/L — SIGNIFICANT CHANGE UP (ref 135–145)
WBC # BLD: 5.25 K/UL — SIGNIFICANT CHANGE UP (ref 3.8–10.5)
WBC # FLD AUTO: 5.25 K/UL — SIGNIFICANT CHANGE UP (ref 3.8–10.5)

## 2024-06-14 PROCEDURE — 80048 BASIC METABOLIC PNL TOTAL CA: CPT

## 2024-06-14 PROCEDURE — C1887: CPT

## 2024-06-14 PROCEDURE — 93458 L HRT ARTERY/VENTRICLE ANGIO: CPT

## 2024-06-14 PROCEDURE — 85027 COMPLETE CBC AUTOMATED: CPT

## 2024-06-14 PROCEDURE — 93005 ELECTROCARDIOGRAM TRACING: CPT

## 2024-06-14 PROCEDURE — C1760: CPT

## 2024-06-14 PROCEDURE — 93010 ELECTROCARDIOGRAM REPORT: CPT

## 2024-06-14 PROCEDURE — C1894: CPT

## 2024-06-14 PROCEDURE — C1769: CPT

## 2024-06-14 RX ORDER — APIXABAN 2.5 MG/1
1 TABLET, FILM COATED ORAL
Refills: 0 | DISCHARGE

## 2024-06-14 RX ORDER — METOPROLOL TARTRATE 50 MG
1 TABLET ORAL
Refills: 0 | DISCHARGE

## 2024-06-14 RX ORDER — LOSARTAN POTASSIUM 100 MG/1
1 TABLET, FILM COATED ORAL
Refills: 0 | DISCHARGE

## 2024-06-14 RX ORDER — ASPIRIN/CALCIUM CARB/MAGNESIUM 324 MG
1 TABLET ORAL
Refills: 0 | DISCHARGE

## 2024-06-14 RX ORDER — LEVOTHYROXINE SODIUM 125 MCG
1 TABLET ORAL
Refills: 0 | DISCHARGE

## 2024-06-14 RX ORDER — SODIUM CHLORIDE 9 MG/ML
250 INJECTION INTRAMUSCULAR; INTRAVENOUS; SUBCUTANEOUS ONCE
Refills: 0 | Status: ACTIVE | OUTPATIENT
Start: 2024-06-14 | End: 2024-06-14

## 2024-06-14 NOTE — H&P CARDIOLOGY - HISTORY OF PRESENT ILLNESS
57 yo male with  PMHX of HTN, HLD, Current smoker, LV systolic dysfunction, LV apical thrombus (resolved) and CAD/Stents to LAD X 3 on 7/2023 presents with persistent neck and chest discomfort. Pt reports that he feels like someone choking him. Pt underwent NST on 6/5/24 which shows small to medium sized area of mild to moderate ischemis in the apical septal , apical lateral wall segments, LVEF 41%. Pt was referred for Cardiac Cath by Dr Nabor Hanna.      TTE 6/23 EF 45% There is moderate hypokinesis of the apical septal, apical anterior segments and akinesis of apical segments. There is a mobile uniformly echogenic mass measuring 1.8x 1.4cm attached to LV apex.

## 2024-06-14 NOTE — H&P CARDIOLOGY - NSICDXPASTMEDICALHX_GEN_ALL_CORE_FT
PAST MEDICAL HISTORY:  CAD (coronary artery disease)     HLD (hyperlipidemia)     HTN (hypertension)     Shingles rt back, 2 yrs ago

## 2024-06-14 NOTE — ASU PATIENT PROFILE, ADULT - FALL HARM RISK - UNIVERSAL INTERVENTIONS
Bed in lowest position, wheels locked, appropriate side rails in place/Call bell, personal items and telephone in reach/Instruct patient to call for assistance before getting out of bed or chair/Non-slip footwear when patient is out of bed/[place] to call system/Physically safe environment - no spills, clutter or unnecessary equipment/Purposeful Proactive Rounding/Room/bathroom lighting operational, light cord in reach

## 2024-06-14 NOTE — ASU DISCHARGE PLAN (ADULT/PEDIATRIC) - CARE PROVIDER_API CALL
Nabor Hanna  Cardiovascular Disease  [address], [address]  [place] [address]  Phone: [phone]  Fax: [phone]  Follow Up Time:

## 2024-06-21 PROBLEM — E78.5 HYPERLIPIDEMIA, UNSPECIFIED: Chronic | Status: ACTIVE | Noted: 2024-06-14

## 2024-06-21 PROBLEM — I25.10 ATHEROSCLEROTIC HEART DISEASE OF NATIVE CORONARY ARTERY WITHOUT ANGINA PECTORIS: Chronic | Status: ACTIVE | Noted: 2024-06-14

## 2024-06-21 PROBLEM — I10 ESSENTIAL (PRIMARY) HYPERTENSION: Chronic | Status: ACTIVE | Noted: 2024-06-14

## 2024-07-02 ENCOUNTER — EMERGENCY (EMERGENCY)
Facility: HOSPITAL | Age: 59
LOS: 1 days | Discharge: HOME | End: 2024-07-02
Attending: EMERGENCY MEDICINE | Admitting: EMERGENCY MEDICINE
Payer: COMMERCIAL

## 2024-07-02 VITALS
DIASTOLIC BLOOD PRESSURE: 94 MMHG | OXYGEN SATURATION: 98 % | HEIGHT: 70 IN | RESPIRATION RATE: 18 BRPM | SYSTOLIC BLOOD PRESSURE: 155 MMHG | TEMPERATURE: 99 F | WEIGHT: 229.94 LBS | HEART RATE: 86 BPM

## 2024-07-02 DIAGNOSIS — Z95.5 PRESENCE OF CORONARY ANGIOPLASTY IMPLANT AND GRAFT: Chronic | ICD-10-CM

## 2024-07-02 PROCEDURE — 99284 EMERGENCY DEPT VISIT MOD MDM: CPT

## 2024-07-02 PROCEDURE — 93010 ELECTROCARDIOGRAM REPORT: CPT

## 2024-07-03 VITALS
DIASTOLIC BLOOD PRESSURE: 79 MMHG | RESPIRATION RATE: 18 BRPM | HEART RATE: 85 BPM | SYSTOLIC BLOOD PRESSURE: 128 MMHG | OXYGEN SATURATION: 97 % | TEMPERATURE: 98 F

## 2024-07-03 LAB
ALBUMIN SERPL ELPH-MCNC: 4.2 G/DL — SIGNIFICANT CHANGE UP (ref 3.3–5)
ALP SERPL-CCNC: 99 U/L — SIGNIFICANT CHANGE UP (ref 40–120)
ALT FLD-CCNC: 32 U/L — SIGNIFICANT CHANGE UP (ref 4–41)
ANION GAP SERPL CALC-SCNC: 12 MMOL/L — SIGNIFICANT CHANGE UP (ref 7–14)
AST SERPL-CCNC: 21 U/L — SIGNIFICANT CHANGE UP (ref 4–40)
BASOPHILS # BLD AUTO: 0.03 K/UL — SIGNIFICANT CHANGE UP (ref 0–0.2)
BASOPHILS NFR BLD AUTO: 0.4 % — SIGNIFICANT CHANGE UP (ref 0–2)
BILIRUB SERPL-MCNC: 0.4 MG/DL — SIGNIFICANT CHANGE UP (ref 0.2–1.2)
BUN SERPL-MCNC: 23 MG/DL — SIGNIFICANT CHANGE UP (ref 7–23)
CALCIUM SERPL-MCNC: 9.1 MG/DL — SIGNIFICANT CHANGE UP (ref 8.4–10.5)
CHLORIDE SERPL-SCNC: 102 MMOL/L — SIGNIFICANT CHANGE UP (ref 98–107)
CO2 SERPL-SCNC: 22 MMOL/L — SIGNIFICANT CHANGE UP (ref 22–31)
CREAT SERPL-MCNC: 1.09 MG/DL — SIGNIFICANT CHANGE UP (ref 0.5–1.3)
EGFR: 78 ML/MIN/1.73M2 — SIGNIFICANT CHANGE UP
EOSINOPHIL # BLD AUTO: 0.08 K/UL — SIGNIFICANT CHANGE UP (ref 0–0.5)
EOSINOPHIL NFR BLD AUTO: 1 % — SIGNIFICANT CHANGE UP (ref 0–6)
GLUCOSE SERPL-MCNC: 91 MG/DL — SIGNIFICANT CHANGE UP (ref 70–99)
HCT VFR BLD CALC: 44 % — SIGNIFICANT CHANGE UP (ref 39–50)
HGB BLD-MCNC: 15.5 G/DL — SIGNIFICANT CHANGE UP (ref 13–17)
IANC: 4.8 K/UL — SIGNIFICANT CHANGE UP (ref 1.8–7.4)
IMM GRANULOCYTES NFR BLD AUTO: 0.2 % — SIGNIFICANT CHANGE UP (ref 0–0.9)
LYMPHOCYTES # BLD AUTO: 2.58 K/UL — SIGNIFICANT CHANGE UP (ref 1–3.3)
LYMPHOCYTES # BLD AUTO: 32 % — SIGNIFICANT CHANGE UP (ref 13–44)
MCHC RBC-ENTMCNC: 31.5 PG — SIGNIFICANT CHANGE UP (ref 27–34)
MCHC RBC-ENTMCNC: 35.2 GM/DL — SIGNIFICANT CHANGE UP (ref 32–36)
MCV RBC AUTO: 89.4 FL — SIGNIFICANT CHANGE UP (ref 80–100)
MONOCYTES # BLD AUTO: 0.56 K/UL — SIGNIFICANT CHANGE UP (ref 0–0.9)
MONOCYTES NFR BLD AUTO: 6.9 % — SIGNIFICANT CHANGE UP (ref 2–14)
NEUTROPHILS # BLD AUTO: 4.8 K/UL — SIGNIFICANT CHANGE UP (ref 1.8–7.4)
NEUTROPHILS NFR BLD AUTO: 59.5 % — SIGNIFICANT CHANGE UP (ref 43–77)
NRBC # BLD: 0 /100 WBCS — SIGNIFICANT CHANGE UP (ref 0–0)
NRBC # FLD: 0 K/UL — SIGNIFICANT CHANGE UP (ref 0–0)
PLATELET # BLD AUTO: 236 K/UL — SIGNIFICANT CHANGE UP (ref 150–400)
POTASSIUM SERPL-MCNC: 4.5 MMOL/L — SIGNIFICANT CHANGE UP (ref 3.5–5.3)
POTASSIUM SERPL-SCNC: 4.5 MMOL/L — SIGNIFICANT CHANGE UP (ref 3.5–5.3)
PROT SERPL-MCNC: 7 G/DL — SIGNIFICANT CHANGE UP (ref 6–8.3)
RBC # BLD: 4.92 M/UL — SIGNIFICANT CHANGE UP (ref 4.2–5.8)
RBC # FLD: 11.8 % — SIGNIFICANT CHANGE UP (ref 10.3–14.5)
SODIUM SERPL-SCNC: 136 MMOL/L — SIGNIFICANT CHANGE UP (ref 135–145)
T3 SERPL-MCNC: 114 NG/DL — SIGNIFICANT CHANGE UP (ref 80–200)
T4 AB SER-ACNC: 7.4 UG/DL — SIGNIFICANT CHANGE UP (ref 5.1–13)
T4 FREE SERPL-MCNC: 1.2 NG/DL — SIGNIFICANT CHANGE UP (ref 0.9–1.8)
TSH SERPL-MCNC: 2.25 UIU/ML — SIGNIFICANT CHANGE UP (ref 0.27–4.2)
WBC # BLD: 8.07 K/UL — SIGNIFICANT CHANGE UP (ref 3.8–10.5)
WBC # FLD AUTO: 8.07 K/UL — SIGNIFICANT CHANGE UP (ref 3.8–10.5)

## 2024-07-03 RX ORDER — SODIUM CHLORIDE 0.9 % (FLUSH) 0.9 %
1000 SYRINGE (ML) INJECTION ONCE
Refills: 0 | Status: COMPLETED | OUTPATIENT
Start: 2024-07-03 | End: 2024-07-03

## 2024-07-03 RX ADMIN — Medication 1000 MILLILITER(S): at 02:37

## 2024-07-09 ENCOUNTER — APPOINTMENT (OUTPATIENT)
Dept: RADIOLOGY | Facility: IMAGING CENTER | Age: 59
End: 2024-07-09
Payer: COMMERCIAL

## 2024-07-09 ENCOUNTER — OUTPATIENT (OUTPATIENT)
Dept: OUTPATIENT SERVICES | Facility: HOSPITAL | Age: 59
LOS: 1 days | End: 2024-07-09
Payer: COMMERCIAL

## 2024-07-09 DIAGNOSIS — I10 ESSENTIAL (PRIMARY) HYPERTENSION: ICD-10-CM

## 2024-07-09 PROCEDURE — 71046 X-RAY EXAM CHEST 2 VIEWS: CPT | Mod: 26

## 2024-07-09 PROCEDURE — 71046 X-RAY EXAM CHEST 2 VIEWS: CPT

## 2024-07-16 ENCOUNTER — APPOINTMENT (OUTPATIENT)
Dept: INTERNAL MEDICINE | Facility: CLINIC | Age: 59
End: 2024-07-16
Payer: COMMERCIAL

## 2024-07-16 VITALS
OXYGEN SATURATION: 96 % | BODY MASS INDEX: 32.93 KG/M2 | DIASTOLIC BLOOD PRESSURE: 75 MMHG | WEIGHT: 230 LBS | HEART RATE: 86 BPM | SYSTOLIC BLOOD PRESSURE: 114 MMHG | HEIGHT: 70 IN

## 2024-07-16 DIAGNOSIS — G47.9 SLEEP DISORDER, UNSPECIFIED: ICD-10-CM

## 2024-07-16 PROCEDURE — G2211 COMPLEX E/M VISIT ADD ON: CPT | Mod: NC

## 2024-07-16 PROCEDURE — 99214 OFFICE O/P EST MOD 30 MIN: CPT

## 2024-07-16 RX ORDER — TRAZODONE HYDROCHLORIDE 50 MG/1
50 TABLET ORAL
Qty: 90 | Refills: 1 | Status: ACTIVE | COMMUNITY
Start: 2024-07-16 | End: 1900-01-01

## 2024-07-28 DIAGNOSIS — G47.33 OBSTRUCTIVE SLEEP APNEA (ADULT) (PEDIATRIC): ICD-10-CM

## 2024-07-29 ENCOUNTER — OUTPATIENT (OUTPATIENT)
Dept: OUTPATIENT SERVICES | Facility: HOSPITAL | Age: 59
LOS: 1 days | End: 2024-07-29
Payer: COMMERCIAL

## 2024-07-29 DIAGNOSIS — G47.33 OBSTRUCTIVE SLEEP APNEA (ADULT) (PEDIATRIC): ICD-10-CM

## 2024-07-29 DIAGNOSIS — Z95.5 PRESENCE OF CORONARY ANGIOPLASTY IMPLANT AND GRAFT: Chronic | ICD-10-CM

## 2024-07-29 PROCEDURE — 95800 SLP STDY UNATTENDED: CPT

## 2024-07-29 PROCEDURE — 95800 SLP STDY UNATTENDED: CPT | Mod: 26

## 2024-08-02 PROBLEM — G47.33 OSA (OBSTRUCTIVE SLEEP APNEA): Status: ACTIVE | Noted: 2024-08-02

## 2025-01-07 ENCOUNTER — RX RENEWAL (OUTPATIENT)
Age: 60
End: 2025-01-07

## 2025-01-21 ENCOUNTER — RX RENEWAL (OUTPATIENT)
Age: 60
End: 2025-01-21

## 2025-02-10 ENCOUNTER — NON-APPOINTMENT (OUTPATIENT)
Age: 60
End: 2025-02-10

## 2025-02-10 ENCOUNTER — APPOINTMENT (OUTPATIENT)
Dept: ELECTROPHYSIOLOGY | Facility: CLINIC | Age: 60
End: 2025-02-10
Payer: COMMERCIAL

## 2025-02-10 ENCOUNTER — RX RENEWAL (OUTPATIENT)
Age: 60
End: 2025-02-10

## 2025-02-10 VITALS
OXYGEN SATURATION: 96 % | HEART RATE: 75 BPM | BODY MASS INDEX: 33 KG/M2 | WEIGHT: 230 LBS | SYSTOLIC BLOOD PRESSURE: 142 MMHG | DIASTOLIC BLOOD PRESSURE: 87 MMHG

## 2025-02-10 DIAGNOSIS — R42 DIZZINESS AND GIDDINESS: ICD-10-CM

## 2025-02-10 PROCEDURE — 93000 ELECTROCARDIOGRAM COMPLETE: CPT

## 2025-02-10 PROCEDURE — 99204 OFFICE O/P NEW MOD 45 MIN: CPT | Mod: 25

## 2025-02-10 RX ORDER — ATORVASTATIN CALCIUM 80 MG/1
80 TABLET, FILM COATED ORAL
Qty: 90 | Refills: 0 | Status: DISCONTINUED | COMMUNITY
Start: 2024-08-22

## 2025-03-26 NOTE — REASON FOR VISIT
I will call today and schedule follow up.    Return in about 6 weeks (around 2/3/2025) for Medication Check.       Medication:   Requested Prescriptions     Pending Prescriptions Disp Refills    DULoxetine (CYMBALTA) 30 MG extended release capsule 90 capsule 1     Sig: Take 1 capsule by mouth daily        Last Filled:      Patient Phone Number: [phone] (home)     Last appt: 12/23/2024   Next appt: Visit date not found    Last OARRS:        No data to display                   [Consultation] : a consultation visit [FreeTextEntry1] : screening colonoscopy

## 2025-06-05 ENCOUNTER — APPOINTMENT (OUTPATIENT)
Dept: GASTROENTEROLOGY | Facility: CLINIC | Age: 60
End: 2025-06-05
Payer: COMMERCIAL

## 2025-06-05 VITALS
TEMPERATURE: 97 F | WEIGHT: 230 LBS | HEART RATE: 81 BPM | OXYGEN SATURATION: 98 % | HEIGHT: 70 IN | SYSTOLIC BLOOD PRESSURE: 135 MMHG | BODY MASS INDEX: 32.93 KG/M2 | DIASTOLIC BLOOD PRESSURE: 83 MMHG

## 2025-06-05 DIAGNOSIS — K63.5 POLYP OF COLON: ICD-10-CM

## 2025-06-05 DIAGNOSIS — I25.118 ATHEROSCLEROTIC HEART DISEASE OF NATIVE CORONARY ARTERY WITH OTHER FORMS OF ANGINA PECTORIS: ICD-10-CM

## 2025-06-05 DIAGNOSIS — Z95.5 PRESENCE OF CORONARY ANGIOPLASTY IMPLANT AND GRAFT: ICD-10-CM

## 2025-06-05 DIAGNOSIS — Z12.12 ENCOUNTER FOR SCREENING FOR MALIGNANT NEOPLASM OF COLON: ICD-10-CM

## 2025-06-05 DIAGNOSIS — Z12.11 ENCOUNTER FOR SCREENING FOR MALIGNANT NEOPLASM OF COLON: ICD-10-CM

## 2025-06-05 PROCEDURE — 99214 OFFICE O/P EST MOD 30 MIN: CPT

## 2025-07-09 ENCOUNTER — APPOINTMENT (OUTPATIENT)
Dept: GASTROENTEROLOGY | Facility: AMBULATORY MEDICAL SERVICES | Age: 60
End: 2025-07-09
Payer: COMMERCIAL

## 2025-07-09 PROCEDURE — 45378 DIAGNOSTIC COLONOSCOPY: CPT | Mod: 33

## 2025-08-04 ENCOUNTER — APPOINTMENT (OUTPATIENT)
Dept: INTERNAL MEDICINE | Facility: CLINIC | Age: 60
End: 2025-08-04
Payer: COMMERCIAL

## 2025-08-04 VITALS
OXYGEN SATURATION: 98 % | HEART RATE: 80 BPM | DIASTOLIC BLOOD PRESSURE: 85 MMHG | RESPIRATION RATE: 16 BRPM | WEIGHT: 235 LBS | SYSTOLIC BLOOD PRESSURE: 139 MMHG | BODY MASS INDEX: 33.64 KG/M2 | HEIGHT: 70 IN

## 2025-08-04 DIAGNOSIS — R91.1 SOLITARY PULMONARY NODULE: ICD-10-CM

## 2025-08-04 DIAGNOSIS — E55.9 VITAMIN D DEFICIENCY, UNSPECIFIED: ICD-10-CM

## 2025-08-04 DIAGNOSIS — K21.9 GASTRO-ESOPHAGEAL REFLUX DISEASE W/OUT ESOPHAGITIS: ICD-10-CM

## 2025-08-04 DIAGNOSIS — Z00.00 ENCOUNTER FOR GENERAL ADULT MEDICAL EXAMINATION W/OUT ABNORMAL FINDINGS: ICD-10-CM

## 2025-08-04 DIAGNOSIS — I10 ESSENTIAL (PRIMARY) HYPERTENSION: ICD-10-CM

## 2025-08-04 DIAGNOSIS — E78.5 HYPERLIPIDEMIA, UNSPECIFIED: ICD-10-CM

## 2025-08-04 DIAGNOSIS — Z95.5 PRESENCE OF CORONARY ANGIOPLASTY IMPLANT AND GRAFT: ICD-10-CM

## 2025-08-04 DIAGNOSIS — R10.31 RIGHT LOWER QUADRANT PAIN: ICD-10-CM

## 2025-08-04 PROCEDURE — 36415 COLL VENOUS BLD VENIPUNCTURE: CPT

## 2025-08-04 PROCEDURE — 99396 PREV VISIT EST AGE 40-64: CPT

## 2025-08-06 ENCOUNTER — CLINICAL ADVICE (OUTPATIENT)
Age: 60
End: 2025-08-06

## 2025-08-06 LAB
25(OH)D3 SERPL-MCNC: 12.7 NG/ML
ALBUMIN SERPL ELPH-MCNC: 4.6 G/DL
ALP BLD-CCNC: 113 U/L
ALT SERPL-CCNC: 47 U/L
ANION GAP SERPL CALC-SCNC: 12 MMOL/L
APPEARANCE: CLEAR
AST SERPL-CCNC: 24 U/L
BACTERIA: NEGATIVE /HPF
BASOPHILS # BLD AUTO: 0.03 K/UL
BASOPHILS NFR BLD AUTO: 0.6 %
BILIRUB DIRECT SERPL-MCNC: 0.2 MG/DL
BILIRUB INDIRECT SERPL-MCNC: 0.5 MG/DL
BILIRUB SERPL-MCNC: 0.7 MG/DL
BILIRUBIN URINE: NEGATIVE
BLOOD URINE: NEGATIVE
BUN SERPL-MCNC: 18 MG/DL
CALCIUM SERPL-MCNC: 9.5 MG/DL
CAST: 0 /LPF
CHLORIDE SERPL-SCNC: 103 MMOL/L
CHOLEST SERPL-MCNC: 158 MG/DL
CO2 SERPL-SCNC: 25 MMOL/L
COLOR: YELLOW
CREAT SERPL-MCNC: 1.14 MG/DL
EGFRCR SERPLBLD CKD-EPI 2021: 74 ML/MIN/1.73M2
EOSINOPHIL # BLD AUTO: 0.05 K/UL
EOSINOPHIL NFR BLD AUTO: 1 %
EPITHELIAL CELLS: 0 /HPF
ESTIMATED AVERAGE GLUCOSE: 120 MG/DL
FERRITIN SERPL-MCNC: 316 NG/ML
FOLATE SERPL-MCNC: 6.5 NG/ML
GLUCOSE QUALITATIVE U: NEGATIVE MG/DL
GLUCOSE SERPL-MCNC: 117 MG/DL
HBA1C MFR BLD HPLC: 5.8 %
HCT VFR BLD CALC: 48.4 %
HDLC SERPL-MCNC: 40 MG/DL
HGB BLD-MCNC: 16.4 G/DL
IMM GRANULOCYTES NFR BLD AUTO: 0.2 %
IRON SATN MFR SERPL: 50 %
IRON SERPL-MCNC: 144 UG/DL
KETONES URINE: NEGATIVE MG/DL
LDLC SERPL-MCNC: 86 MG/DL
LEUKOCYTE ESTERASE URINE: NEGATIVE
LYMPHOCYTES # BLD AUTO: 2.02 K/UL
LYMPHOCYTES NFR BLD AUTO: 38.6 %
MAN DIFF?: NORMAL
MCHC RBC-ENTMCNC: 30.8 PG
MCHC RBC-ENTMCNC: 33.9 G/DL
MCV RBC AUTO: 90.8 FL
MICROSCOPIC-UA: NORMAL
MONOCYTES # BLD AUTO: 0.44 K/UL
MONOCYTES NFR BLD AUTO: 8.4 %
NEUTROPHILS # BLD AUTO: 2.68 K/UL
NEUTROPHILS NFR BLD AUTO: 51.2 %
NITRITE URINE: NEGATIVE
NONHDLC SERPL-MCNC: 117 MG/DL
PH URINE: 5.5
PLATELET # BLD AUTO: 222 K/UL
POTASSIUM SERPL-SCNC: 4.7 MMOL/L
PROT SERPL-MCNC: 6.9 G/DL
PROTEIN URINE: NEGATIVE MG/DL
PSA SERPL-MCNC: 0.63 NG/ML
RBC # BLD: 5.33 M/UL
RBC # FLD: 12.8 %
RED BLOOD CELLS URINE: 0 /HPF
SODIUM SERPL-SCNC: 140 MMOL/L
SPECIFIC GRAVITY URINE: 1.02
TIBC SERPL-MCNC: 289 UG/DL
TRIGL SERPL-MCNC: 186 MG/DL
TSH SERPL-ACNC: 3.95 UIU/ML
UIBC SERPL-MCNC: 145 UG/DL
UROBILINOGEN URINE: 0.2 MG/DL
VIT B12 SERPL-MCNC: 483 PG/ML
WBC # FLD AUTO: 5.23 K/UL
WHITE BLOOD CELLS URINE: 0 /HPF

## 2025-09-08 ENCOUNTER — NON-APPOINTMENT (OUTPATIENT)
Age: 60
End: 2025-09-08

## 2025-09-08 VITALS — HEIGHT: 70 IN | BODY MASS INDEX: 33.64 KG/M2 | WEIGHT: 235 LBS

## 2025-09-08 DIAGNOSIS — Z87.891 PERSONAL HISTORY OF NICOTINE DEPENDENCE: ICD-10-CM

## 2025-09-09 ENCOUNTER — APPOINTMENT (OUTPATIENT)
Dept: CT IMAGING | Facility: IMAGING CENTER | Age: 60
End: 2025-09-09
Payer: COMMERCIAL

## 2025-09-09 PROCEDURE — 74176 CT ABD & PELVIS W/O CONTRAST: CPT | Mod: 26

## 2025-09-09 PROCEDURE — 71271 CT THORAX LUNG CANCER SCR C-: CPT | Mod: 26

## 2025-09-11 ENCOUNTER — CLINICAL ADVICE (OUTPATIENT)
Age: 60
End: 2025-09-11

## 2025-09-11 ENCOUNTER — TRANSCRIPTION ENCOUNTER (OUTPATIENT)
Age: 60
End: 2025-09-11

## 2025-09-18 ENCOUNTER — CLINICAL ADVICE (OUTPATIENT)
Age: 60
End: 2025-09-18